# Patient Record
Sex: FEMALE | Race: WHITE | Employment: OTHER | URBAN - METROPOLITAN AREA
[De-identification: names, ages, dates, MRNs, and addresses within clinical notes are randomized per-mention and may not be internally consistent; named-entity substitution may affect disease eponyms.]

---

## 2017-01-13 PROBLEM — C83.30 DLBCL (DIFFUSE LARGE B CELL LYMPHOMA) (HCC): Status: ACTIVE | Noted: 2017-01-13

## 2017-01-20 PROBLEM — D70.9 NEUTROPENIC FEVER (HCC): Status: ACTIVE | Noted: 2017-01-20

## 2017-01-20 PROBLEM — R50.81 NEUTROPENIC FEVER (HCC): Status: ACTIVE | Noted: 2017-01-20

## 2017-01-27 ENCOUNTER — TELEPHONE (OUTPATIENT)
Dept: SURGERY | Age: 62
End: 2017-01-27

## 2017-01-31 ENCOUNTER — HOSPITAL ENCOUNTER (OUTPATIENT)
Dept: PREADMISSION TESTING | Age: 62
Discharge: HOME | End: 2017-02-21
Admitting: SURGERY

## 2017-02-03 ENCOUNTER — TELEPHONE (OUTPATIENT)
Dept: SURGERY | Age: 62
End: 2017-02-03

## 2017-02-23 ENCOUNTER — OFFICE VISIT (OUTPATIENT)
Dept: PULMONOLOGY | Age: 62
End: 2017-02-23

## 2017-02-23 VITALS
OXYGEN SATURATION: 99 % | WEIGHT: 240 LBS | HEART RATE: 96 BPM | DIASTOLIC BLOOD PRESSURE: 76 MMHG | SYSTOLIC BLOOD PRESSURE: 128 MMHG | RESPIRATION RATE: 20 BRPM | HEIGHT: 59 IN | BODY MASS INDEX: 48.38 KG/M2

## 2017-02-23 DIAGNOSIS — J96.11 CHRONIC HYPOXEMIC RESPIRATORY FAILURE (HCC): ICD-10-CM

## 2017-02-23 DIAGNOSIS — C83.30 DLBCL (DIFFUSE LARGE B CELL LYMPHOMA) (HCC): ICD-10-CM

## 2017-02-23 DIAGNOSIS — J18.9 PNEUMONIA OF LEFT LOWER LOBE DUE TO INFECTIOUS ORGANISM: Primary | ICD-10-CM

## 2017-02-23 PROCEDURE — 99214 OFFICE O/P EST MOD 30 MIN: CPT | Performed by: INTERNAL MEDICINE

## 2017-02-23 RX ORDER — MONTELUKAST SODIUM 10 MG/1
10 TABLET ORAL DAILY
Qty: 30 TABLET | Refills: 3 | Status: SHIPPED | OUTPATIENT
Start: 2017-02-23 | End: 2017-03-09 | Stop reason: ALTCHOICE

## 2017-02-23 RX ORDER — ZOLPIDEM TARTRATE 5 MG/1
5 TABLET ORAL NIGHTLY PRN
COMMUNITY
End: 2017-02-23 | Stop reason: SDUPTHER

## 2017-02-23 RX ORDER — PREDNISONE 10 MG/1
10 TABLET ORAL DAILY
Status: ON HOLD | COMMUNITY
End: 2017-03-03 | Stop reason: HOSPADM

## 2017-05-18 ENCOUNTER — TELEPHONE (OUTPATIENT)
Dept: PULMONOLOGY | Age: 62
End: 2017-05-18

## 2017-05-25 ENCOUNTER — HOSPITAL ENCOUNTER (OUTPATIENT)
Dept: CT IMAGING | Age: 62
Discharge: HOME | End: 2017-05-25
Attending: INTERNAL MEDICINE | Admitting: INTERNAL MEDICINE

## 2017-05-25 DIAGNOSIS — C83.30 DIFFUSE LARGE B-CELL LYMPHOMA (HCC): ICD-10-CM

## 2017-05-25 DIAGNOSIS — C83.30 DLBCL (DIFFUSE LARGE B CELL LYMPHOMA) (HCC): ICD-10-CM

## 2017-05-25 DIAGNOSIS — R19.7 DIARRHEA, UNSPECIFIED TYPE: ICD-10-CM

## 2017-05-31 LAB
C. DIFF GLUTAMATE DEHYDROGENASE: NORMAL
CLOSTRIDIUM DIFFICILE TOXIN GE: NOT DETECTED

## 2018-07-18 ENCOUNTER — APPOINTMENT (OUTPATIENT)
Dept: CT IMAGING | Age: 63
DRG: 191 | End: 2018-07-18
Payer: MEDICAID

## 2018-07-18 ENCOUNTER — HOSPITAL ENCOUNTER (INPATIENT)
Age: 63
LOS: 3 days | Discharge: NURSING, REHAB OR LONG-TERM CARE | DRG: 191 | End: 2018-07-25
Attending: EMERGENCY MEDICINE | Admitting: EMERGENCY MEDICINE
Payer: MEDICAID

## 2018-07-18 ENCOUNTER — APPOINTMENT (OUTPATIENT)
Dept: GENERAL RADIOLOGY | Age: 63
DRG: 191 | End: 2018-07-18
Payer: MEDICAID

## 2018-07-18 DIAGNOSIS — I20.0 UNSTABLE ANGINA PECTORIS (HCC): Primary | ICD-10-CM

## 2018-07-18 DIAGNOSIS — Z95.1 HX OF CABG: ICD-10-CM

## 2018-07-18 DIAGNOSIS — N39.0 URINARY TRACT INFECTION ASSOCIATED WITH INDWELLING URETHRAL CATHETER, INITIAL ENCOUNTER (HCC): ICD-10-CM

## 2018-07-18 DIAGNOSIS — T83.511A URINARY TRACT INFECTION ASSOCIATED WITH INDWELLING URETHRAL CATHETER, INITIAL ENCOUNTER (HCC): ICD-10-CM

## 2018-07-18 DIAGNOSIS — C82.32 FOLLICULAR LYMPHOMA GRADE IIIA OF INTRATHORACIC LYMPH NODES (HCC): ICD-10-CM

## 2018-07-18 PROBLEM — R07.9 CHEST PAIN: Status: ACTIVE | Noted: 2018-07-18

## 2018-07-18 LAB
ALBUMIN SERPL-MCNC: 3.5 G/DL (ref 3.4–5)
ALP BLD-CCNC: 106 U/L (ref 40–129)
ALT SERPL-CCNC: 9 U/L (ref 10–40)
ANION GAP SERPL CALCULATED.3IONS-SCNC: 13 MMOL/L (ref 3–16)
AST SERPL-CCNC: 15 U/L (ref 15–37)
BACTERIA: ABNORMAL /HPF
BASOPHILS ABSOLUTE: 0 K/UL (ref 0–0.2)
BASOPHILS RELATIVE PERCENT: 0.7 %
BILIRUB SERPL-MCNC: <0.2 MG/DL (ref 0–1)
BILIRUBIN DIRECT: <0.2 MG/DL (ref 0–0.3)
BILIRUBIN URINE: NEGATIVE MG/DL
BILIRUBIN, INDIRECT: ABNORMAL MG/DL (ref 0–1)
BLOOD, URINE: ABNORMAL
BUN BLDV-MCNC: 29 MG/DL (ref 7–20)
CALCIUM SERPL-MCNC: 9.3 MG/DL (ref 8.3–10.6)
CHLORIDE BLD-SCNC: 98 MMOL/L (ref 99–110)
CLARITY: ABNORMAL
CO2: 27 MMOL/L (ref 21–32)
COLOR: ABNORMAL
CREAT SERPL-MCNC: 1.2 MG/DL (ref 0.6–1.2)
EKG ATRIAL RATE: 63 BPM
EKG DIAGNOSIS: NORMAL
EKG P AXIS: 69 DEGREES
EKG P-R INTERVAL: 164 MS
EKG Q-T INTERVAL: 448 MS
EKG QRS DURATION: 118 MS
EKG QTC CALCULATION (BAZETT): 458 MS
EKG R AXIS: -58 DEGREES
EKG T AXIS: 71 DEGREES
EKG VENTRICULAR RATE: 63 BPM
EOSINOPHILS ABSOLUTE: 0.2 K/UL (ref 0–0.6)
EOSINOPHILS RELATIVE PERCENT: 2.9 %
EPITHELIAL CELLS, UA: ABNORMAL /HPF
GFR AFRICAN AMERICAN: 55
GFR NON-AFRICAN AMERICAN: 45
GLUCOSE BLD-MCNC: 115 MG/DL (ref 70–99)
GLUCOSE BLD-MCNC: 136 MG/DL (ref 70–99)
GLUCOSE BLD-MCNC: 227 MG/DL (ref 70–99)
GLUCOSE URINE: NEGATIVE MG/DL
HCT VFR BLD CALC: 27.4 % (ref 36–48)
HEMOGLOBIN: 8.6 G/DL (ref 12–16)
KETONES, URINE: NEGATIVE MG/DL
LEUKOCYTE ESTERASE, URINE: ABNORMAL
LIPASE: 21 U/L (ref 13–60)
LV EF: 41 %
LVEF MODALITY: NORMAL
LYMPHOCYTES ABSOLUTE: 1.2 K/UL (ref 1–5.1)
LYMPHOCYTES RELATIVE PERCENT: 16.1 %
MCH RBC QN AUTO: 28.9 PG (ref 26–34)
MCHC RBC AUTO-ENTMCNC: 31.3 G/DL (ref 31–36)
MCV RBC AUTO: 92.3 FL (ref 80–100)
MICROSCOPIC EXAMINATION: YES
MONOCYTES ABSOLUTE: 0.6 K/UL (ref 0–1.3)
MONOCYTES RELATIVE PERCENT: 8.8 %
NEUTROPHILS ABSOLUTE: 5.1 K/UL (ref 1.7–7.7)
NEUTROPHILS RELATIVE PERCENT: 71.5 %
NITRITE, URINE: POSITIVE
PDW BLD-RTO: 16.8 % (ref 12.4–15.4)
PERFORMED ON: ABNORMAL
PERFORMED ON: ABNORMAL
PH UA: 5.5
PLATELET # BLD: 203 K/UL (ref 135–450)
PMV BLD AUTO: 9.3 FL (ref 5–10.5)
POTASSIUM REFLEX MAGNESIUM: 5.2 MMOL/L (ref 3.5–5.1)
PRO-BNP: 2523 PG/ML (ref 0–124)
PROTEIN UA: >=300 MG/DL
RBC # BLD: 2.97 M/UL (ref 4–5.2)
RBC UA: ABNORMAL /HPF (ref 0–2)
SODIUM BLD-SCNC: 138 MMOL/L (ref 136–145)
SPECIFIC GRAVITY UA: 1.01
TOTAL PROTEIN: 7.1 G/DL (ref 6.4–8.2)
TROPONIN: <0.01 NG/ML
UROBILINOGEN, URINE: 0.2 E.U./DL
WBC # BLD: 7.2 K/UL (ref 4–11)
WBC UA: ABNORMAL /HPF (ref 0–5)

## 2018-07-18 PROCEDURE — 71046 X-RAY EXAM CHEST 2 VIEWS: CPT

## 2018-07-18 PROCEDURE — 94799 UNLISTED PULMONARY SVC/PX: CPT

## 2018-07-18 PROCEDURE — 83880 ASSAY OF NATRIURETIC PEPTIDE: CPT

## 2018-07-18 PROCEDURE — 80076 HEPATIC FUNCTION PANEL: CPT

## 2018-07-18 PROCEDURE — G0378 HOSPITAL OBSERVATION PER HR: HCPCS

## 2018-07-18 PROCEDURE — 99285 EMERGENCY DEPT VISIT HI MDM: CPT

## 2018-07-18 PROCEDURE — 36415 COLL VENOUS BLD VENIPUNCTURE: CPT

## 2018-07-18 PROCEDURE — 85025 COMPLETE CBC W/AUTO DIFF WBC: CPT

## 2018-07-18 PROCEDURE — 94640 AIRWAY INHALATION TREATMENT: CPT

## 2018-07-18 PROCEDURE — 80048 BASIC METABOLIC PNL TOTAL CA: CPT

## 2018-07-18 PROCEDURE — 6360000002 HC RX W HCPCS: Performed by: INTERNAL MEDICINE

## 2018-07-18 PROCEDURE — C8929 TTE W OR WO FOL WCON,DOPPLER: HCPCS

## 2018-07-18 PROCEDURE — 87077 CULTURE AEROBIC IDENTIFY: CPT

## 2018-07-18 PROCEDURE — 71250 CT THORAX DX C-: CPT

## 2018-07-18 PROCEDURE — 83036 HEMOGLOBIN GLYCOSYLATED A1C: CPT

## 2018-07-18 PROCEDURE — 6370000000 HC RX 637 (ALT 250 FOR IP): Performed by: EMERGENCY MEDICINE

## 2018-07-18 PROCEDURE — 81001 URINALYSIS AUTO W/SCOPE: CPT

## 2018-07-18 PROCEDURE — 94760 N-INVAS EAR/PLS OXIMETRY 1: CPT

## 2018-07-18 PROCEDURE — 93005 ELECTROCARDIOGRAM TRACING: CPT | Performed by: EMERGENCY MEDICINE

## 2018-07-18 PROCEDURE — 2580000003 HC RX 258: Performed by: INTERNAL MEDICINE

## 2018-07-18 PROCEDURE — 2700000000 HC OXYGEN THERAPY PER DAY

## 2018-07-18 PROCEDURE — 6360000002 HC RX W HCPCS: Performed by: EMERGENCY MEDICINE

## 2018-07-18 PROCEDURE — 87186 SC STD MICRODIL/AGAR DIL: CPT

## 2018-07-18 PROCEDURE — 6370000000 HC RX 637 (ALT 250 FOR IP): Performed by: INTERNAL MEDICINE

## 2018-07-18 PROCEDURE — 84484 ASSAY OF TROPONIN QUANT: CPT

## 2018-07-18 PROCEDURE — 83690 ASSAY OF LIPASE: CPT

## 2018-07-18 PROCEDURE — 96374 THER/PROPH/DIAG INJ IV PUSH: CPT

## 2018-07-18 PROCEDURE — 87086 URINE CULTURE/COLONY COUNT: CPT

## 2018-07-18 RX ORDER — ALBUTEROL SULFATE 2.5 MG/3ML
2.5 SOLUTION RESPIRATORY (INHALATION) 3 TIMES DAILY
Status: DISCONTINUED | OUTPATIENT
Start: 2018-07-18 | End: 2018-07-25 | Stop reason: HOSPADM

## 2018-07-18 RX ORDER — HYDROCODONE BITARTRATE AND ACETAMINOPHEN 5; 325 MG/1; MG/1
1 TABLET ORAL ONCE
Status: COMPLETED | OUTPATIENT
Start: 2018-07-18 | End: 2018-07-18

## 2018-07-18 RX ORDER — DEXTROSE MONOHYDRATE 25 G/50ML
12.5 INJECTION, SOLUTION INTRAVENOUS PRN
Status: DISCONTINUED | OUTPATIENT
Start: 2018-07-18 | End: 2018-07-25 | Stop reason: HOSPADM

## 2018-07-18 RX ORDER — DIAZEPAM 5 MG/1
2.5 TABLET ORAL EVERY 12 HOURS PRN
Status: DISCONTINUED | OUTPATIENT
Start: 2018-07-18 | End: 2018-07-25 | Stop reason: HOSPADM

## 2018-07-18 RX ORDER — GABAPENTIN 100 MG/1
100 CAPSULE ORAL 3 TIMES DAILY
Status: DISCONTINUED | OUTPATIENT
Start: 2018-07-18 | End: 2018-07-25 | Stop reason: HOSPADM

## 2018-07-18 RX ORDER — ACETAMINOPHEN 325 MG/1
650 TABLET ORAL EVERY 4 HOURS PRN
Status: DISCONTINUED | OUTPATIENT
Start: 2018-07-18 | End: 2018-07-25 | Stop reason: HOSPADM

## 2018-07-18 RX ORDER — ACETAMINOPHEN 325 MG/1
650 TABLET ORAL EVERY 4 HOURS PRN
COMMUNITY

## 2018-07-18 RX ORDER — POLYETHYLENE GLYCOL 3350 17 G/17G
17 POWDER, FOR SOLUTION ORAL DAILY PRN
COMMUNITY

## 2018-07-18 RX ORDER — METHOCARBAMOL 500 MG/1
250 TABLET, FILM COATED ORAL 2 TIMES DAILY
COMMUNITY

## 2018-07-18 RX ORDER — NITROGLYCERIN 0.4 MG/1
0.4 TABLET SUBLINGUAL EVERY 5 MIN PRN
Status: COMPLETED | OUTPATIENT
Start: 2018-07-18 | End: 2018-07-24

## 2018-07-18 RX ORDER — MAGNESIUM OXIDE 400 MG/1
400 TABLET ORAL 2 TIMES DAILY
COMMUNITY

## 2018-07-18 RX ORDER — NICOTINE POLACRILEX 4 MG
15 LOZENGE BUCCAL PRN
Status: DISCONTINUED | OUTPATIENT
Start: 2018-07-18 | End: 2018-07-25 | Stop reason: HOSPADM

## 2018-07-18 RX ORDER — VENLAFAXINE 75 MG/1
75 TABLET ORAL 3 TIMES DAILY
Status: DISCONTINUED | OUTPATIENT
Start: 2018-07-18 | End: 2018-07-18

## 2018-07-18 RX ORDER — CARVEDILOL 12.5 MG/1
12.5 TABLET ORAL 2 TIMES DAILY
Status: DISCONTINUED | OUTPATIENT
Start: 2018-07-18 | End: 2018-07-21

## 2018-07-18 RX ORDER — GABAPENTIN 600 MG/1
600 TABLET ORAL NIGHTLY
COMMUNITY

## 2018-07-18 RX ORDER — ONDANSETRON 2 MG/ML
4 INJECTION INTRAMUSCULAR; INTRAVENOUS EVERY 6 HOURS PRN
Status: DISCONTINUED | OUTPATIENT
Start: 2018-07-18 | End: 2018-07-25 | Stop reason: HOSPADM

## 2018-07-18 RX ORDER — SODIUM CHLORIDE 0.9 % (FLUSH) 0.9 %
10 SYRINGE (ML) INJECTION PRN
Status: DISCONTINUED | OUTPATIENT
Start: 2018-07-18 | End: 2018-07-25 | Stop reason: HOSPADM

## 2018-07-18 RX ORDER — ALBUTEROL SULFATE 2.5 MG/3ML
2.5 SOLUTION RESPIRATORY (INHALATION) EVERY 6 HOURS PRN
Status: DISCONTINUED | OUTPATIENT
Start: 2018-07-18 | End: 2018-07-25 | Stop reason: HOSPADM

## 2018-07-18 RX ORDER — DEXTROSE MONOHYDRATE 50 MG/ML
100 INJECTION, SOLUTION INTRAVENOUS PRN
Status: DISCONTINUED | OUTPATIENT
Start: 2018-07-18 | End: 2018-07-25 | Stop reason: HOSPADM

## 2018-07-18 RX ORDER — NITROGLYCERIN 0.4 MG/1
0.4 TABLET SUBLINGUAL EVERY 5 MIN PRN
Status: DISCONTINUED | OUTPATIENT
Start: 2018-07-18 | End: 2018-07-18

## 2018-07-18 RX ORDER — ERGOCALCIFEROL 1.25 MG/1
50000 CAPSULE ORAL
COMMUNITY

## 2018-07-18 RX ORDER — ONDANSETRON 2 MG/ML
4 INJECTION INTRAMUSCULAR; INTRAVENOUS
Status: DISCONTINUED | OUTPATIENT
Start: 2018-07-18 | End: 2018-07-18

## 2018-07-18 RX ORDER — CALCIUM CARBONATE/VITAMIN D3 600 MG-10
1 TABLET ORAL DAILY
COMMUNITY

## 2018-07-18 RX ORDER — NITROFURANTOIN MACROCRYSTALS 50 MG/1
50 CAPSULE ORAL EVERY 6 HOURS SCHEDULED
Status: DISCONTINUED | OUTPATIENT
Start: 2018-07-18 | End: 2018-07-22

## 2018-07-18 RX ORDER — ATORVASTATIN CALCIUM 40 MG/1
40 TABLET, FILM COATED ORAL NIGHTLY
Status: DISCONTINUED | OUTPATIENT
Start: 2018-07-18 | End: 2018-07-25 | Stop reason: HOSPADM

## 2018-07-18 RX ORDER — HYDROCODONE BITARTRATE AND ACETAMINOPHEN 5; 325 MG/1; MG/1
1 TABLET ORAL EVERY 4 HOURS PRN
Status: DISCONTINUED | OUTPATIENT
Start: 2018-07-18 | End: 2018-07-20

## 2018-07-18 RX ORDER — ZOLPIDEM TARTRATE 5 MG/1
5 TABLET ORAL NIGHTLY PRN
Status: DISCONTINUED | OUTPATIENT
Start: 2018-07-18 | End: 2018-07-18

## 2018-07-18 RX ORDER — SODIUM CHLORIDE 0.9 % (FLUSH) 0.9 %
10 SYRINGE (ML) INJECTION EVERY 12 HOURS SCHEDULED
Status: DISCONTINUED | OUTPATIENT
Start: 2018-07-18 | End: 2018-07-25 | Stop reason: HOSPADM

## 2018-07-18 RX ORDER — GABAPENTIN 300 MG/1
300 CAPSULE ORAL 2 TIMES DAILY
COMMUNITY

## 2018-07-18 RX ORDER — PANTOPRAZOLE SODIUM 40 MG/1
40 TABLET, DELAYED RELEASE ORAL DAILY
COMMUNITY

## 2018-07-18 RX ORDER — HYDROCODONE BITARTRATE AND ACETAMINOPHEN 5; 325 MG/1; MG/1
2 TABLET ORAL EVERY 4 HOURS PRN
Status: DISCONTINUED | OUTPATIENT
Start: 2018-07-18 | End: 2018-07-20

## 2018-07-18 RX ORDER — AMLODIPINE BESYLATE 5 MG/1
5 TABLET ORAL DAILY
Status: DISCONTINUED | OUTPATIENT
Start: 2018-07-18 | End: 2018-07-18

## 2018-07-18 RX ORDER — CALCITRIOL 0.25 UG/1
0.5 CAPSULE, LIQUID FILLED ORAL DAILY
Status: DISCONTINUED | OUTPATIENT
Start: 2018-07-18 | End: 2018-07-18

## 2018-07-18 RX ORDER — ACYCLOVIR 200 MG/1
400 CAPSULE ORAL 3 TIMES DAILY
Status: DISCONTINUED | OUTPATIENT
Start: 2018-07-18 | End: 2018-07-18

## 2018-07-18 RX ORDER — NITROFURANTOIN 25; 75 MG/1; MG/1
100 CAPSULE ORAL ONCE
Status: COMPLETED | OUTPATIENT
Start: 2018-07-18 | End: 2018-07-18

## 2018-07-18 RX ORDER — ASPIRIN 81 MG/1
81 TABLET ORAL DAILY
Status: DISCONTINUED | OUTPATIENT
Start: 2018-07-19 | End: 2018-07-25 | Stop reason: HOSPADM

## 2018-07-18 RX ADMIN — GABAPENTIN 100 MG: 100 CAPSULE ORAL at 20:31

## 2018-07-18 RX ADMIN — NITROFURANTOIN MONOHYDRATE AND NITROFURANTOIN, MACROCRYSTALLINE 100 MG: 75; 25 CAPSULE ORAL at 16:06

## 2018-07-18 RX ADMIN — ENOXAPARIN SODIUM 40 MG: 40 INJECTION SUBCUTANEOUS at 20:31

## 2018-07-18 RX ADMIN — HYDROCODONE BITARTRATE AND ACETAMINOPHEN 1 TABLET: 5; 325 TABLET ORAL at 15:25

## 2018-07-18 RX ADMIN — Medication 10 ML: at 20:32

## 2018-07-18 RX ADMIN — HYDROCODONE BITARTRATE AND ACETAMINOPHEN 2 TABLET: 5; 325 TABLET ORAL at 21:22

## 2018-07-18 RX ADMIN — ATORVASTATIN CALCIUM 40 MG: 40 TABLET, FILM COATED ORAL at 20:30

## 2018-07-18 RX ADMIN — NITROGLYCERIN 0.4 MG: 0.4 TABLET SUBLINGUAL at 13:19

## 2018-07-18 RX ADMIN — ALBUTEROL SULFATE 2.5 MG: 2.5 SOLUTION RESPIRATORY (INHALATION) at 18:56

## 2018-07-18 RX ADMIN — NITROFURANTOIN MACROCRYSTALS 50 MG: 50 CAPSULE ORAL at 20:30

## 2018-07-18 RX ADMIN — CARVEDILOL 12.5 MG: 12.5 TABLET, FILM COATED ORAL at 20:30

## 2018-07-18 RX ADMIN — ONDANSETRON 4 MG: 2 INJECTION INTRAMUSCULAR; INTRAVENOUS at 13:19

## 2018-07-18 ASSESSMENT — PAIN DESCRIPTION - ORIENTATION
ORIENTATION: LOWER;MID
ORIENTATION: LOWER

## 2018-07-18 ASSESSMENT — PAIN DESCRIPTION - ONSET
ONSET: ON-GOING
ONSET: ON-GOING

## 2018-07-18 ASSESSMENT — ENCOUNTER SYMPTOMS
NAUSEA: 1
COUGH: 0
SHORTNESS OF BREATH: 1
ABDOMINAL PAIN: 0
DIARRHEA: 0
VOMITING: 0

## 2018-07-18 ASSESSMENT — PAIN DESCRIPTION - LOCATION
LOCATION: BACK
LOCATION: CHEST
LOCATION: ABDOMEN;BACK

## 2018-07-18 ASSESSMENT — PAIN DESCRIPTION - FREQUENCY
FREQUENCY: CONTINUOUS
FREQUENCY: INTERMITTENT

## 2018-07-18 ASSESSMENT — PAIN DESCRIPTION - PAIN TYPE
TYPE: ACUTE PAIN;CHRONIC PAIN
TYPE: CHRONIC PAIN
TYPE: ACUTE PAIN

## 2018-07-18 ASSESSMENT — PAIN SCALES - GENERAL
PAINLEVEL_OUTOF10: 7
PAINLEVEL_OUTOF10: 4
PAINLEVEL_OUTOF10: 9
PAINLEVEL_OUTOF10: 9
PAINLEVEL_OUTOF10: 7
PAINLEVEL_OUTOF10: 6

## 2018-07-18 ASSESSMENT — PAIN DESCRIPTION - PROGRESSION
CLINICAL_PROGRESSION: NOT CHANGED
CLINICAL_PROGRESSION: GRADUALLY WORSENING

## 2018-07-18 ASSESSMENT — ACTIVITIES OF DAILY LIVING (ADL): EFFECT OF PAIN ON DAILY ACTIVITIES: DISCOMFORT

## 2018-07-18 ASSESSMENT — PAIN DESCRIPTION - DESCRIPTORS
DESCRIPTORS: ACHING
DESCRIPTORS: ACHING

## 2018-07-18 ASSESSMENT — PAIN DESCRIPTION - DIRECTION: RADIATING_TOWARDS: NECK

## 2018-07-18 NOTE — ED PROVIDER NOTES
[address] [facility]          ATTENDING PHYSICIAN NOTE       Date of evaluation: 7/18/2018    Chief Complaint     Chest Pain and Shortness of Breath      History of Present Illness     Julianna Simmonds is a 57 y.o. female with a history of coronary artery disease, bypass surgery and stent in 2015, who presents with pressure-like chest pain that started 2 hours ago similar to prior ACS pain. She had associated SOB, nausea and diaphoresis. The pain radiated from the left chest to left axilla and left arm. Earlier in the am the patient was having exertional dyspnea that improved with rest as well. The symptoms were concerning to her that her heart might be causing the discomfort. She had a new nitroglycerin patch applied this morning but did not take any additional nitroglycerin when the pain developed. She is not reporting productive cough, fever, or chills. She does have a little bit of lightheadedness associated with the symptoms. The patient also reports some lower abdominal pain for several days duration as well. She has an indwelling Ba catheter. Review of Systems     Review of Systems   Constitutional: Positive for diaphoresis and fatigue. Negative for chills and fever. Respiratory: Positive for shortness of breath. Negative for cough. Cardiovascular: Positive for chest pain. Gastrointestinal: Positive for nausea. Negative for abdominal pain, diarrhea and vomiting. Genitourinary: Positive for difficulty urinating. Neurological: Positive for light-headedness. All other systems reviewed and are negative. Past Medical, Surgical, Family, and Social History     She has a past medical history of Arthritis; Asthma; Cancer ([address] Chronic airway obstruction ([address] Chronic back pain; Coronary artery disease; CRF (chronic renal failure); Diabetes mellitus ([address] Hidradenitis; History of blood transfusion; Hyperlipidemia; Hypertension;  Hypothyroidism; Interstitial lung disease ([facility] NSTEMI (non-ST elevated myocardial infarction) ([facility] [address] Sciatica; and Seborrhea capitis. She has a past surgical history that includes Ovary removal; Tonsillectomy; Carpal tunnel release; Coronary angioplasty with stent; and Coronary artery bypass graft (04/2015). Her family history includes Heart Disease in her brother and father; Kidney Disease in her brother; Other in her sister; Stroke in her father and mother. She reports that she quit smoking about 39 years ago. She has never used smokeless tobacco. She reports that she does not drink alcohol or use drugs. Medications     Previous Medications    ALBUTEROL (PROVENTIL) (2.5 MG/3ML) 0.083% NEBULIZER SOLUTION    Take 3 mLs by nebulization every 6 hours as needed (cough)    ALBUTEROL SULFATE  (90 BASE) MCG/ACT INHALER    Inhale 2 puffs into the lungs every 6 hours as needed for Shortness of Breath    AMLODIPINE (NORVASC) 5 MG TABLET    Take 1 tablet by mouth daily    ASPIRIN 81 MG EC TABLET    Take 81 mg by mouth daily    ATORVASTATIN (LIPITOR) 40 MG TABLET    Take 40 mg by mouth nightly     CALCITRIOL (ROCALTROL) 0.5 MCG CAPSULE    Take 1 capsule by mouth daily    CARVEDILOL (COREG) 12.5 MG TABLET    Take 1 tablet by mouth 2 times daily    CETIRIZINE (ZYRTEC) 10 MG TABLET    Take 1 tablet by mouth daily    CLONIDINE (CATAPRES) 0.1 MG TABLET    Take 0.1 mg by mouth every 6 hours as needed (SBP greater than 160)     GABAPENTIN (NEURONTIN) 100 MG CAPSULE    Take 1 capsule by mouth 3 times daily    HYDROCODONE-ACETAMINOPHEN (NORCO) 5-325 MG PER TABLET    Take 1 tablet by mouth every 6 hours as needed for Pain .  Earliest Fill Date: 6/1/17    INSULIN DETEMIR (LEVEMIR) 100 UNIT/ML INJECTION VIAL    Inject 25 Units into the skin 2 times daily    INSULIN LISPRO (HUMALOG) 100 UNIT/ML INJECTION VIAL    Inject 2-10 Units into the skin 3 times daily (before meals) Per home sliding scale    LEVOTHYROXINE (SYNTHROID) 175 MCG TABLET MPV 9.3 5.0 - 10.5 fL    Neutrophils % 71.5 %    Lymphocytes % 16.1 %    Monocytes % 8.8 %    Eosinophils % 2.9 %    Basophils % 0.7 %    Neutrophils # 5.1 1.7 - 7.7 K/uL    Lymphocytes # 1.2 1.0 - 5.1 K/uL    Monocytes # 0.6 0.0 - 1.3 K/uL    Eosinophils # 0.2 0.0 - 0.6 K/uL    Basophils # 0.0 0.0 - 0.2 K/uL   Hepatic function panel   Result Value Ref Range    Total Protein 7.1 6.4 - 8.2 g/dL    Alb 3.5 3.4 - 5.0 g/dL    Alkaline Phosphatase 106 40 - 129 U/L    ALT 9 (L) 10 - 40 U/L    AST 15 15 - 37 U/L    Total Bilirubin <0.2 0.0 - 1.0 mg/dL    Bilirubin, Direct <0.2 0.0 - 0.3 mg/dL    Bilirubin, Indirect see below 0.0 - 1.0 mg/dL   Lipase   Result Value Ref Range    Lipase 21.0 13.0 - 60.0 U/L   Basic Metabolic Panel w/ Reflex to MG   Result Value Ref Range    Sodium 138 136 - 145 mmol/L    Potassium reflex Magnesium 5.2 (H) 3.5 - 5.1 mmol/L    Chloride 98 (L) 99 - 110 mmol/L    CO2 27 21 - 32 mmol/L    Anion Gap 13 3 - 16    Glucose 227 (H) 70 - 99 mg/dL    BUN 29 (H) 7 - 20 mg/dL    CREATININE 1.2 0.6 - 1.2 mg/dL    GFR Non-African American 45 (A) >60    GFR  55 (A) >60    Calcium 9.3 8.3 - 10.6 mg/dL   Troponin   Result Value Ref Range    Troponin <0.01 <0.01 ng/mL   Brain natriuretic peptide   Result Value Ref Range    Pro-BNP 2,523 (H) 0 - 124 pg/mL   POC URINE with Microscopic   Result Value Ref Range    Color, UA Not Entered Straw/Yellow    Clarity, UA Not Entered Clear    Glucose, Ur Negative Negative mg/dL    Bilirubin Urine Negative Negative mg/dL    Ketones, Urine Negative Negative mg/dL    Specific Gravity, UA 1.010 1.005 - 1.030    Blood, Urine TRACE-LYSED (A) Negative    pH, UA 5.5 5.0 - 8.0    Protein, UA >=300 (A) Negative mg/dL    Urobilinogen, Urine 0.2 <2.0 E.U./dL    Nitrite, Urine POSITIVE (A) Negative    Leukocyte Esterase, Urine TRACE (A) Negative    Microscopic Examination Yes    EKG 12 Lead   Result Value Ref Range    Ventricular Rate 63 BPM    Atrial Rate 63 BPM    P-R

## 2018-07-18 NOTE — H&P
Hospital Medicine History & Physical      PCP: Jae Beatriz    Date of Admission: 7/18/2018    Date of Service: Pt seen/examined on 07/18/18 and placed in observation. Chief Complaint:  Chest pain and shortness of breath      History Of Present Illness:      57 y.o. female who presented to [facility] emergency room. Has been complaining of anterior pressure-like chest pain that started 2 hours before presenting to the emergency room, this was very similar to prior coronary artery pain. Also complains of associated dyspnea, nausea and diaphoresis. The whole episode lasted about half an hour. At the time of the interview, patient was pain free  The pain started at left chest, and radiated to to left axilla and left arm. States last night she also had an episode of chest tightness but it was of short duration. Earlier in the morning today, the patient was having shortness of breath with exertion that improved with rest.    Patient got concerned because the pain was similar to her previous angina. A new nitroglycerin patch applied this morning, patient did not take any additional nitroglycerin when the pain developed. Otherwise, patient does not have any productive cough, fever, or chills. She does have a little bit of lightheadedness associated with the symptoms. The patient also reports some lower abdominal pain for several days. She has an indwelling Ba catheter. It was inserted during last hospitalization because of urinary retention. Was not meant to be long-term, but patient has not had time to see a urologist yet.     Past Medical History:          Diagnosis Date    Arthritis     Asthma     Cancer ([facility] [address]     Chronic airway obstruction (HCC)     Chronic back pain     Coronary artery disease 10/2014    2 stents to LAD    CRF (chronic renal failure)     baseline Cr 1.1    Diabetes mellitus (HCC)     A1c 6.6 11/30/2014    Hidradenitis     History of blood transfusion     Hyperlipidemia     Hypertension     Hypothyroidism     Interstitial lung disease ([facility] [address]     NSTEMI (non-ST elevated myocardial infarction) ([facility] [address] 10/2014    Sciatica     Seborrhea capitis        Past Surgical History:          Procedure Laterality Date    CARPAL TUNNEL RELEASE      right arm    CORONARY ANGIOPLASTY WITH STENT PLACEMENT      CORONARY ARTERY BYPASS GRAFT  04/2015    OVARY REMOVAL      TONSILLECTOMY         Medications Prior to Admission:      Prior to Admission medications    Medication Sig Start Date End Date Taking? Authorizing Provider   venlafaxine (EFFEXOR) 75 MG tablet Take 1 tablet by mouth 3 times daily 6/1/17   MILKA Nunez CNP   calcitRIOL (ROCALTROL) 0.5 MCG capsule Take 1 capsule by mouth daily 6/1/17   MILKA Nunez CNP   carvedilol (COREG) 12.5 MG tablet Take 1 tablet by mouth 2 times daily 6/1/17   MILKA Nunez CNP   HYDROcodone-acetaminophen (NORCO) 5-325 MG per tablet Take 1 tablet by mouth every 6 hours as needed for Pain . Earliest Fill Date: 6/1/17 6/1/17   MILKA Nunez CNP   Lenoard Sayres Mixture with lidocaine 1-2 tsp s/swallow q4-6h don't eat x 15 min after. 5/10/17   Roberta Kendrick., DO   ondansetron (ZOFRAN) 8 MG tablet TAKE ONE TABLET BY MOUTH EVERY 8 HOURS AS NEEDED FOR NAUSEA OR VOMITING 5/9/17   Roberta Kendrick., DO   valACYclovir (VALTREX) 500 MG tablet Take 1 tablet by mouth 2 times daily 4/14/17   MILKA Nunez CNP   zolpidem (AMBIEN) 5 MG tablet Take 1 tablet by mouth nightly as needed for Sleep 4/14/17   MILKA Nunez CNP   nystatin (MYCOSTATIN) 562531 UNIT/ML suspension Take 5 mLs by mouth 4 times daily Swish solution in mouth before swallowing. 4/12/17   MILKA Nunez CNP   valACYclovir (VALTREX) 500 MG tablet Take 1500mg (3 tablets) every 12 hours for first 3 doses, then take 500mg (1 tablet) 2 times daily.  4/7/17   Nadia Thompson Cheadle, APRN - CNP   albuterol (PROVENTIL) (2.5 MG/3ML) 0.083% nebulizer solution Take 3 mLs by nebulization every 6 hours as needed (cough) 2/16/17   MILKA Ludwig CNP   albuterol sulfate  (90 BASE) MCG/ACT inhaler Inhale 2 puffs into the lungs every 6 hours as needed for Shortness of Breath 2/16/17   MILKA Ludwig CNP   mometasone-formoterol [facility]) 200-5 MCG/ACT inhaler Inhale 2 puffs into the lungs 2 times daily 1/27/17   Joann Lyon MD   cetirizine (ZYRTEC) 10 MG tablet Take 1 tablet by mouth daily 1/27/17   Joann Lyon MD   amLODIPine (NORVASC) 5 MG tablet Take 1 tablet by mouth daily 1/27/17   Joann Lyon MD   senna (SENOKOT) 8.6 MG TABS tablet Take 1 tablet by mouth 2 times daily 12/7/16   Joann Lyon MD   insulin detemir (LEVEMIR) 100 UNIT/ML injection vial Inject 25 Units into the skin 2 times daily 12/7/16   Joann Lyon MD   aspirin 81 MG EC tablet Take 81 mg by mouth daily    Historical Provider, MD   cloNIDine (CATAPRES) 0.1 MG tablet Take 0.1 mg by mouth every 6 hours as needed (SBP greater than 160)     Historical Provider, MD   gabapentin (NEURONTIN) 100 MG capsule Take 1 capsule by mouth 3 times daily 8/16/16   Kalyani Jones MD   nitroGLYCERIN (NITRODUR) 0.4 MG/HR Place 1 patch onto the skin nightly    Historical Provider, MD   atorvastatin (LIPITOR) 40 MG tablet Take 40 mg by mouth nightly     Historical Provider, MD   omeprazole (PRILOSEC) 20 MG capsule Take 20 mg by mouth daily    Historical Provider, MD   simethicone (MYLICON) 80 MG chewable tablet Take 80 mg by mouth every 6 hours as needed for Flatulence     Historical Provider, MD   sodium chloride (OCEAN, BABY AYR) 0.65 % nasal spray 1 spray by Each Nare route as needed for Congestion    Historical Provider, MD   loperamide (IMODIUM) 2 MG capsule Take 2 mg by mouth 4 times daily as needed for Diarrhea    Historical Provider, MD   insulin clear.  Neck: Supple, with full range of motion. No jugular venous distention. Trachea midline. Respiratory:  Normal respiratory effort. Clear to auscultation, bilaterally without Rales/Wheezes/Rhonchi. Cardiovascular:  Regular rate and rhythm with normal S1/S2 without murmurs, rubs or gallops. Abdomen: Soft, non-tender, non-distended with normal bowel sounds. Musculoskeletal:  No clubbing, cyanosis or edema bilaterally. Full range of motion without deformity. Skin: Skin color, texture, turgor normal.  No rashes or lesions. Neurologic:  Neurovascularly intact without any focal sensory/motor deficits. Cranial nerves: II-XII intact, grossly non-focal.  Psychiatric:  Alert and oriented, thought content appropriate, normal insight  Capillary Refill: Brisk,< 3 seconds   Peripheral Pulses: +2 palpable, equal bilaterally       Labs:     Recent Labs      07/18/18   1324   WBC  7.2   HGB  8.6*   HCT  27.4*   PLT  203     Recent Labs      07/18/18   1324   NA  138   K  5.2*   CL  98*   CO2  27   BUN  29*   CREATININE  1.2   CALCIUM  9.3     Recent Labs      07/18/18   1324   AST  15   ALT  9*   BILIDIR  <0.2   BILITOT  <0.2   ALKPHOS  106     No results for input(s): INR in the last 72 hours. Recent Labs      07/18/18   1324   TROPONINI  <0.01       Urinalysis:      Lab Results   Component Value Date    NITRU POSITIVE 07/18/2018    WBCUA 3-5 07/18/2018    BACTERIA 2+ 07/18/2018    RBCUA None seen 07/18/2018    BLOODU TRACE-LYSED 07/18/2018    SPECGRAV 1.010 07/18/2018    GLUCOSEU Negative 07/18/2018       Radiology:     CXR: I have reviewed the CXR with the following interpretation: Unremarkable  EKG:  I have reviewed the EKG with the following interpretation: Normal sinus rhythm, 1 PVC, no signs of acute or chronic ischemia. XR CHEST STANDARD (2 VW)   Final Result      Impression: No acute process.       Interpreted by:   Lina Cary MD      Signed by:   Lina Cary MD   7/18/18      Final result

## 2018-07-18 NOTE — PROGRESS NOTES
4 Eyes Admission Assessment     I agree as the admission nurse that 2 RN's have performed a thorough Head to Toe Skin Assessment on the patient. ALL assessment sites listed below have been assessed on admission. Areas assessed by both nurses:   [x]   Head, Face, and Ears   [x]   Shoulders, Back, and Chest  [x]   Arms, Elbows, and Hands   [x]   Coccyx, Sacrum, and Ischum  [x]   Legs, Feet, and Heels        Does the Patient have Skin Breakdown? Yes a wound was noted on the Admission Assessment and an LDA was Initiated documentation include the Katelin-wound, Wound Assessment, Measurements, Dressing Treatment, Drainage, and Color\",         Jaya Prevention initiated:  Yes   Wound Care Orders initiated:  Yes      WOC nurse consulted for Pressure Injury (Stage 3,4, Unstageable, DTI, NWPT, and Complex wounds):  Yes      Nurse 1 eSignature: Electronically signed by Ludy Sheikh RN on 7/18/2018 at 6:25 PM      **SHARE this note so that the co-signing nurse is able to place an eSignature**    Nurse 2 eSignature: Electronically signed by Rex Soliz RN on 7/20/18 at 6:32 AM

## 2018-07-18 NOTE — ED TRIAGE NOTES
Pt c/o chest pain and sob x2 hours. Pt was diaphoretic at nursing home, given 324 of aspirin and has nitro patch on right upper chest. Pt states chest pain feels better but breathing \"feels bad. \"

## 2018-07-18 NOTE — PROGRESS NOTES
education:  Excellent     Is patient being placed on Home Treatment Regimen? Yes     Does the patient have everything they need prior to discharge? NA     Comments: Pt not admitted with respiratory illness. Plan of Care: Continue HHN Albuterol TID and prn, MDI Dulera 2p BID and 02. Electronically signed by Beatriz Perkins RCP on 7/18/2018 at 7:05 PM    Respiratory Protocol Guidelines     1. Assessment and treatment by Respiratory Therapy will be initiated for medication and therapeutic interventions upon initiation of aerosolized medication. 2. Physician will be contacted for respiratory rate (RR) greater than 35 breaths per minute. Therapy will be held for heart rate (HR) greater than 140 beats per minute, pending direction from physician. 3. Bronchodilators will be administered via Metered Dose Inhaler (MDI) with spacer when the following criteria are met:  a. Alert and cooperative     b. HR < 140 bpm  c. RR < 30 bpm                d. Can demonstrate a 2-3 second inspiratory hold  4. Bronchodilators will be administered via Hand Held Nebulizer [facility]) to patients when ANY of the following criteria are met  a. Incognizant or uncooperative          b. Patients treated with HHN at Home        c. Unable to demonstrate proper use of MDI with spacer     d. RR > 30 bpm   5. Bronchodilators will be delivered via Metered Dose Inhaler (MDI), HHN, Aerogen to intubated patients on mechanical ventilation. 6. Inhalation medication orders will be delivered and/or substituted as outlined below. Aerosolized Medications Ordering and Administration Guidelines:    1. All Medications will be ordered by a physician, and their frequency and/or modality will be adjusted as defined by the patients Respiratory Severity Index (RSI) score.   2. If the patient does not have documented COPD, consider discontinuing anticholinergics when RSI is less than 9.  3. If the bronchospasm worsens (increased RSI), then the bronchodilator

## 2018-07-19 ENCOUNTER — APPOINTMENT (OUTPATIENT)
Dept: NUCLEAR MEDICINE | Age: 63
DRG: 191 | End: 2018-07-19
Payer: MEDICAID

## 2018-07-19 PROBLEM — Z95.1 HX OF CABG: Status: ACTIVE | Noted: 2018-07-19

## 2018-07-19 LAB
A/G RATIO: 1 (ref 1.1–2.2)
ALBUMIN SERPL-MCNC: 3.1 G/DL (ref 3.4–5)
ALP BLD-CCNC: 97 U/L (ref 40–129)
ALT SERPL-CCNC: 9 U/L (ref 10–40)
ANION GAP SERPL CALCULATED.3IONS-SCNC: 11 MMOL/L (ref 3–16)
AST SERPL-CCNC: 16 U/L (ref 15–37)
BASOPHILS ABSOLUTE: 0 K/UL (ref 0–0.2)
BASOPHILS RELATIVE PERCENT: 0.3 %
BILIRUB SERPL-MCNC: <0.2 MG/DL (ref 0–1)
BUN BLDV-MCNC: 27 MG/DL (ref 7–20)
CALCIUM SERPL-MCNC: 9 MG/DL (ref 8.3–10.6)
CHLORIDE BLD-SCNC: 103 MMOL/L (ref 99–110)
CHOLESTEROL, TOTAL: 86 MG/DL (ref 0–199)
CO2: 29 MMOL/L (ref 21–32)
CREAT SERPL-MCNC: 1.2 MG/DL (ref 0.6–1.2)
EOSINOPHILS ABSOLUTE: 0.2 K/UL (ref 0–0.6)
EOSINOPHILS RELATIVE PERCENT: 3.3 %
ESTIMATED AVERAGE GLUCOSE: 211.6 MG/DL
GFR AFRICAN AMERICAN: 55
GFR NON-AFRICAN AMERICAN: 45
GLOBULIN: 3.2 G/DL
GLUCOSE BLD-MCNC: 134 MG/DL (ref 70–99)
GLUCOSE BLD-MCNC: 157 MG/DL (ref 70–99)
GLUCOSE BLD-MCNC: 157 MG/DL (ref 70–99)
GLUCOSE BLD-MCNC: 160 MG/DL (ref 70–99)
HBA1C MFR BLD: 9 %
HCT VFR BLD CALC: 25.1 % (ref 36–48)
HDLC SERPL-MCNC: 27 MG/DL (ref 40–60)
HEMOGLOBIN: 8.1 G/DL (ref 12–16)
LDL CHOLESTEROL CALCULATED: 18 MG/DL
LV EF: 50 %
LVEF MODALITY: NORMAL
LYMPHOCYTES ABSOLUTE: 1 K/UL (ref 1–5.1)
LYMPHOCYTES RELATIVE PERCENT: 15.9 %
MCH RBC QN AUTO: 29.3 PG (ref 26–34)
MCHC RBC AUTO-ENTMCNC: 32.2 G/DL (ref 31–36)
MCV RBC AUTO: 91.1 FL (ref 80–100)
MONOCYTES ABSOLUTE: 0.6 K/UL (ref 0–1.3)
MONOCYTES RELATIVE PERCENT: 9.5 %
NEUTROPHILS ABSOLUTE: 4.4 K/UL (ref 1.7–7.7)
NEUTROPHILS RELATIVE PERCENT: 71 %
PDW BLD-RTO: 17.2 % (ref 12.4–15.4)
PERFORMED ON: ABNORMAL
PLATELET # BLD: 186 K/UL (ref 135–450)
PMV BLD AUTO: 9.3 FL (ref 5–10.5)
POTASSIUM REFLEX MAGNESIUM: 4.6 MMOL/L (ref 3.5–5.1)
RBC # BLD: 2.75 M/UL (ref 4–5.2)
SODIUM BLD-SCNC: 143 MMOL/L (ref 136–145)
TOTAL PROTEIN: 6.3 G/DL (ref 6.4–8.2)
TRIGL SERPL-MCNC: 207 MG/DL (ref 0–150)
TROPONIN: 0.01 NG/ML
VLDLC SERPL CALC-MCNC: 41 MG/DL
WBC # BLD: 6.2 K/UL (ref 4–11)

## 2018-07-19 PROCEDURE — 78452 HT MUSCLE IMAGE SPECT MULT: CPT

## 2018-07-19 PROCEDURE — 94760 N-INVAS EAR/PLS OXIMETRY 1: CPT

## 2018-07-19 PROCEDURE — 85025 COMPLETE CBC W/AUTO DIFF WBC: CPT

## 2018-07-19 PROCEDURE — 2700000000 HC OXYGEN THERAPY PER DAY

## 2018-07-19 PROCEDURE — 3430000000 HC RX DIAGNOSTIC RADIOPHARMACEUTICAL: Performed by: INTERNAL MEDICINE

## 2018-07-19 PROCEDURE — 6360000002 HC RX W HCPCS: Performed by: INTERNAL MEDICINE

## 2018-07-19 PROCEDURE — 94640 AIRWAY INHALATION TREATMENT: CPT

## 2018-07-19 PROCEDURE — 84484 ASSAY OF TROPONIN QUANT: CPT

## 2018-07-19 PROCEDURE — 2500000003 HC RX 250 WO HCPCS: Performed by: INTERNAL MEDICINE

## 2018-07-19 PROCEDURE — 36415 COLL VENOUS BLD VENIPUNCTURE: CPT

## 2018-07-19 PROCEDURE — 80053 COMPREHEN METABOLIC PANEL: CPT

## 2018-07-19 PROCEDURE — A9502 TC99M TETROFOSMIN: HCPCS | Performed by: INTERNAL MEDICINE

## 2018-07-19 PROCEDURE — 6360000004 HC RX CONTRAST MEDICATION: Performed by: INTERNAL MEDICINE

## 2018-07-19 PROCEDURE — G0378 HOSPITAL OBSERVATION PER HR: HCPCS

## 2018-07-19 PROCEDURE — 2580000003 HC RX 258: Performed by: INTERNAL MEDICINE

## 2018-07-19 PROCEDURE — 94664 DEMO&/EVAL PT USE INHALER: CPT

## 2018-07-19 PROCEDURE — 80061 LIPID PANEL: CPT

## 2018-07-19 PROCEDURE — 93005 ELECTROCARDIOGRAM TRACING: CPT | Performed by: INTERNAL MEDICINE

## 2018-07-19 PROCEDURE — 93017 CV STRESS TEST TRACING ONLY: CPT

## 2018-07-19 PROCEDURE — 6370000000 HC RX 637 (ALT 250 FOR IP): Performed by: INTERNAL MEDICINE

## 2018-07-19 RX ADMIN — PERFLUTREN 2.2 MG: 6.52 INJECTION, SUSPENSION INTRAVENOUS at 14:58

## 2018-07-19 RX ADMIN — TETROFOSMIN 30 MILLICURIE: 1.38 INJECTION, POWDER, LYOPHILIZED, FOR SOLUTION INTRAVENOUS at 13:47

## 2018-07-19 RX ADMIN — ALBUTEROL SULFATE 2.5 MG: 2.5 SOLUTION RESPIRATORY (INHALATION) at 08:00

## 2018-07-19 RX ADMIN — NITROGLYCERIN 0.4 MG: 0.4 TABLET SUBLINGUAL at 21:31

## 2018-07-19 RX ADMIN — GABAPENTIN 100 MG: 100 CAPSULE ORAL at 19:12

## 2018-07-19 RX ADMIN — HYDROCODONE BITARTRATE AND ACETAMINOPHEN 2 TABLET: 5; 325 TABLET ORAL at 03:23

## 2018-07-19 RX ADMIN — INSULIN LISPRO 1 UNITS: 100 INJECTION, SOLUTION INTRAVENOUS; SUBCUTANEOUS at 22:46

## 2018-07-19 RX ADMIN — ASPIRIN 81 MG: 81 TABLET, COATED ORAL at 09:33

## 2018-07-19 RX ADMIN — Medication 10 ML: at 12:11

## 2018-07-19 RX ADMIN — LEVOTHYROXINE SODIUM 175 MCG: 150 TABLET ORAL at 05:20

## 2018-07-19 RX ADMIN — Medication 10 ML: at 22:33

## 2018-07-19 RX ADMIN — ONDANSETRON 4 MG: 2 INJECTION INTRAMUSCULAR; INTRAVENOUS at 19:12

## 2018-07-19 RX ADMIN — HYDROCODONE BITARTRATE AND ACETAMINOPHEN 2 TABLET: 5; 325 TABLET ORAL at 22:30

## 2018-07-19 RX ADMIN — ALBUTEROL SULFATE 2.5 MG: 2.5 SOLUTION RESPIRATORY (INHALATION) at 21:46

## 2018-07-19 RX ADMIN — ATORVASTATIN CALCIUM 40 MG: 40 TABLET, FILM COATED ORAL at 22:29

## 2018-07-19 RX ADMIN — TETROFOSMIN 10 MILLICURIE: 1.38 INJECTION, POWDER, LYOPHILIZED, FOR SOLUTION INTRAVENOUS at 07:47

## 2018-07-19 RX ADMIN — AMINOPHYLLINE 75 MG: 25 INJECTION, SOLUTION INTRAVENOUS at 13:48

## 2018-07-19 RX ADMIN — HYDROCODONE BITARTRATE AND ACETAMINOPHEN 2 TABLET: 5; 325 TABLET ORAL at 09:33

## 2018-07-19 RX ADMIN — Medication 10 ML: at 07:48

## 2018-07-19 RX ADMIN — CARVEDILOL 12.5 MG: 12.5 TABLET, FILM COATED ORAL at 16:36

## 2018-07-19 RX ADMIN — NITROFURANTOIN MACROCRYSTALS 50 MG: 50 CAPSULE ORAL at 19:12

## 2018-07-19 RX ADMIN — REGADENOSON 0.4 MG: 0.08 INJECTION, SOLUTION INTRAVENOUS at 13:47

## 2018-07-19 RX ADMIN — ENOXAPARIN SODIUM 40 MG: 40 INJECTION SUBCUTANEOUS at 09:33

## 2018-07-19 RX ADMIN — NITROFURANTOIN MACROCRYSTALS 50 MG: 50 CAPSULE ORAL at 03:23

## 2018-07-19 RX ADMIN — NITROGLYCERIN 0.4 MG: 0.4 TABLET SUBLINGUAL at 21:38

## 2018-07-19 RX ADMIN — ONDANSETRON 4 MG: 2 INJECTION INTRAMUSCULAR; INTRAVENOUS at 00:42

## 2018-07-19 RX ADMIN — HYDROCODONE BITARTRATE AND ACETAMINOPHEN 2 TABLET: 5; 325 TABLET ORAL at 16:36

## 2018-07-19 RX ADMIN — GABAPENTIN 100 MG: 100 CAPSULE ORAL at 12:10

## 2018-07-19 RX ADMIN — CARVEDILOL 12.5 MG: 12.5 TABLET, FILM COATED ORAL at 22:29

## 2018-07-19 RX ADMIN — NITROFURANTOIN MACROCRYSTALS 50 MG: 50 CAPSULE ORAL at 12:10

## 2018-07-19 ASSESSMENT — PAIN DESCRIPTION - PROGRESSION
CLINICAL_PROGRESSION: NOT CHANGED

## 2018-07-19 ASSESSMENT — PAIN DESCRIPTION - PAIN TYPE
TYPE: ACUTE PAIN;CHRONIC PAIN
TYPE: CHRONIC PAIN;ACUTE PAIN
TYPE: CHRONIC PAIN
TYPE: CHRONIC PAIN

## 2018-07-19 ASSESSMENT — PAIN DESCRIPTION - ONSET
ONSET: ON-GOING

## 2018-07-19 ASSESSMENT — PAIN SCALES - GENERAL
PAINLEVEL_OUTOF10: 9
PAINLEVEL_OUTOF10: 6
PAINLEVEL_OUTOF10: 7
PAINLEVEL_OUTOF10: 7
PAINLEVEL_OUTOF10: 10
PAINLEVEL_OUTOF10: 9

## 2018-07-19 ASSESSMENT — PAIN DESCRIPTION - LOCATION
LOCATION: ABDOMEN;BACK
LOCATION: ABDOMEN;BACK;GENERALIZED
LOCATION: ABDOMEN;BACK
LOCATION: ABDOMEN

## 2018-07-19 ASSESSMENT — PAIN DESCRIPTION - DESCRIPTORS
DESCRIPTORS: ACHING

## 2018-07-19 ASSESSMENT — PAIN DESCRIPTION - ORIENTATION
ORIENTATION: LOWER;MID

## 2018-07-19 ASSESSMENT — PAIN DESCRIPTION - FREQUENCY
FREQUENCY: CONTINUOUS

## 2018-07-19 NOTE — CONSULTS
History of Present Illness:  Ami Maradiaga is a 57 y.o. patient whom we were asked by the hospitalists to see for chest pain. Chest pain was sharp initially and persisted. Somewhat different from anything prior. Some sob/diaphoresis. She says it lasted a total of about 30 minutes. Had resolved by time she got to ER. No recent sx. No pnd or orthopnea. No palp/tachycardia/syncope. Past Medical History:   has a past medical history of Arthritis; Asthma; Cancer ([address] Chronic airway obstruction ([address] Chronic back pain; Coronary artery disease; CRF (chronic renal failure); Diabetes mellitus ([address] Hidradenitis; History of blood transfusion; Hyperlipidemia; Hypertension; Hypothyroidism; Interstitial lung disease ([address] NSTEMI (non-ST elevated myocardial infarction) ([facility] Sciatica; and Seborrhea capitis. Surgical History:   has a past surgical history that includes Ovary removal; Tonsillectomy; Carpal tunnel release; Coronary angioplasty with stent; and Coronary artery bypass graft (04/2015). Social History:   reports that she quit smoking about 39 years ago. She has never used smokeless tobacco. She reports that she does not drink alcohol or use drugs. Family History:  No evidence for sudden cardiac death or premature CAD    Home Medications:  Were reviewed and are listed in nursing record. and/or listed below  Prior to Admission medications    Medication Sig Start Date End Date Taking? Authorizing Provider   Calcium Carb-Cholecalciferol (CALCIUM-VITAMIN D) 600-400 MG-UNIT TABS Take 1 tablet by mouth daily   Yes Historical Provider, MD   gabapentin (NEURONTIN) 600 MG tablet Take 600 mg by mouth nightly. .   Yes Historical Provider, MD   gabapentin (NEURONTIN) 300 MG capsule Take 300 mg by mouth 2 times daily. .   Yes Historical Provider, MD   magnesium oxide (MAG-OX) 400 MG tablet Take 400 mg by mouth 2 times daily   Yes Historical Provider, MD   pantoprazole (PROTONIX) 40 MG tablet Take 40 mg by mouth daily   Yes Historical Provider, MD   Prenatal Vit-Fe Fumarate-FA (PNV PRENATAL PLUS MULTIVITAMIN PO) Take 1 tablet by mouth daily   Yes Historical Provider, MD   vitamin D (ERGOCALCIFEROL) 12942 units CAPS capsule Take 50,000 Units by mouth every 30 days   Yes Historical Provider, MD   methocarbamol (ROBAXIN) 500 MG tablet Take 250 mg by mouth 2 times daily   Yes Historical Provider, MD   Menthol, Topical Analgesic, (BIOFREEZE EX) Apply topically 2 times daily   Yes Historical Provider, MD   insulin detemir (LEVEMIR) 100 UNIT/ML injection vial Inject 50 Units into the skin 2 times daily   Yes Historical Provider, MD   Probiotic Product (ACIDOPHILUS/BIFIDUS PO) Take 2 capsules by mouth 2 times daily   Yes Historical Provider, MD   carvedilol (COREG) 12.5 MG tablet Take 1 tablet by mouth 2 times daily 6/1/17  Yes Wadell Furbish Cheadle, APRN - CNP   albuterol (PROVENTIL) (2.5 MG/3ML) 0.083% nebulizer solution Take 3 mLs by nebulization every 6 hours as needed (cough) 2/16/17  Yes MILKA Duggan CNP   aspirin 81 MG EC tablet Take 81 mg by mouth daily   Yes Historical Provider, MD   nitroGLYCERIN (NITRODUR) 0.4 MG/HR Place 1 patch onto the skin daily    Yes Historical Provider, MD   atorvastatin (LIPITOR) 40 MG tablet Take 40 mg by mouth nightly    Yes Historical Provider, MD   insulin lispro (HUMALOG) 100 UNIT/ML injection vial Inject 2-10 Units into the skin 3 times daily (before meals) Per home sliding scale   Yes Historical Provider, MD   levothyroxine (SYNTHROID) 175 MCG tablet Take 175 mcg by mouth every morning (before breakfast)    Yes Historical Provider, MD   acetaminophen (TYLENOL) 325 MG tablet Take 650 mg by mouth every 4 hours as needed for Pain or Fever    Historical Provider, MD   polyethylene glycol (GLYCOLAX) powder Take 17 g by mouth daily as needed    Historical Provider, MD   HYDROcodone-acetaminophen (NORCO) 5-325 MG per tablet Take 1 tablet by mouth every 6 hours as needed No gait disturbance, weakness or joint complaints. · Integumentary: No rash or pruritis. · Other systems reviewed negative a done.     Physical Examination:    Vitals:    07/19/18 0928   BP: (!) 165/78   Pulse: 63   Resp: 16   Temp:    SpO2: 100%    Weight: 270 lb (122.5 kg)         General Appearance:  Alert, cooperative, no distress, appears stated age   Head:  Normocephalic, without obvious abnormality, atraumatic   Eyes:  Conjunctiva/corneas clear       Nose: Nares normal   Throat: Lips normal   Neck: Supple, symmetrical, trachea midline       Lungs:   Clear to auscultation bilaterally, respirations unlabored   Chest Wall:  No tenderness or deformity   Heart:  Regular rate and rhythm, S1, S2 normal, no murmur, rub or gallop   Abdomen:   Soft, non-tender, bowel sounds active all four quadrants,  no masses, no organomegaly           Extremities: Extremities normal, atraumatic, no cyanosis       Skin: Skin color, texture, turgor normal, no rashes or lesions   Pysch: Normal mood and affect   Neurologic: Normal gross motor and sensory exam.         Labs  CBC:   Lab Results   Component Value Date    WBC 6.2 07/19/2018    RBC 2.75 07/19/2018    RBC 2.98 04/14/2017    HGB 8.1 07/19/2018    HCT 25.1 07/19/2018    MCV 91.1 07/19/2018    RDW 17.2 07/19/2018     07/19/2018     CMP:    Lab Results   Component Value Date     07/19/2018    K 4.6 07/19/2018     07/19/2018    CO2 29 07/19/2018    BUN 27 07/19/2018    CREATININE 1.2 07/19/2018    GFRAA 55 07/19/2018    AGRATIO 1.0 07/19/2018    LABGLOM 45 07/19/2018    GLUCOSE 134 07/19/2018    GLUCOSE 235 03/30/2017    PROT 6.3 07/19/2018    PROT 6.7 03/30/2017    CALCIUM 9.0 07/19/2018    BILITOT <0.2 07/19/2018    ALKPHOS 97 07/19/2018    AST 16 07/19/2018    ALT 9 07/19/2018     PT/INR:  No results found for: PTINR  Lab Results   Component Value Date    CKTOTAL 280 (H) 10/03/2014    CKMB 3.3 10/04/2014    TROPONINI <0.01 07/18/2018       EKG:  I have reviewed EKG with the following interpretation:  Impression:  NSR, PVC, IVCD, LAFB. Assessment  Patient Active Problem List   Diagnosis    Atypical chest pain    Systolic murmur    Essential hypertension    Hypothyroidism    Interstitial lung disease ([address]    Back pain    Morbid obesity due to excess calories ([address]    NSTEMI (non-ST elevated myocardial infarction) ([address]    Respiratory failure ([address]    Coronary artery disease due to lipid rich plaque    Shortness of breath    PNA (pneumonia)    Calf pain    Acute encephalopathy    Chronic respiratory failure, unspecified whether with hypoxia or hypercapnia (HCC)    Normocytic anemia    Accidental fall from other furniture    Hypertension, well controlled    Hyperlipidemia    Anemia    Hydradenitis    Pyelonephritis    Sepsis ([address]    Hydradenitis    Calculus of gallbladder    Pleural effusion    Chronic hypoxemic respiratory failure (HCC)    Fall    Pleural effusion    Pleural effusion, left    Lymphadenopathy, periaortic    Agranulocytosis secondary to cancer chemotherapy (HCC)    Follicular lymphoma grade IIIa of intrathoracic lymph nodes ([address]    Encounter for antineoplastic chemotherapy    DLBCL (diffuse large B cell lymphoma) (HCC)    Neutropenic fever (HCC)    Chronic obstructive pulmonary disease with acute exacerbation (HCC)    Pain in thoracic spine    Paroxysmal tachycardia (HCC)    V tach (HCC)    Chest pain    Urinary tract infection associated with indwelling urethral catheter ([address]    Hx of CABG         Plan:    Chest pain atypical.  Negative serial enzymes. No ischemic changes by EKG. Agree with Myoview.

## 2018-07-19 NOTE — PROGRESS NOTES
hours. Recent Labs      07/18/18   1324  07/18/18   2041  07/18/18   2311   TROPONINI  <0.01  <0.01  <0.01       Urinalysis:    Lab Results   Component Value Date    NITRU POSITIVE 07/18/2018    WBCUA 3-5 07/18/2018    BACTERIA 2+ 07/18/2018    RBCUA None seen 07/18/2018    BLOODU TRACE-LYSED 07/18/2018    SPECGRAV 1.010 07/18/2018    GLUCOSEU Negative 07/18/2018       Radiology:  CT CHEST WO CONTRAST   Final Result      1. Small left pleural effusion. 2. Mild mediastinal lymphadenopathy which could be reactive or   neoplastic. 3. Cholelithiasis. 4. Soft tissue density in the retroperitoneum as seen previously   which could relate to previously treated lymphoma. Active lymphoma   be difficult to exclude. Interpreted by:   Sonal Alexis MD      Signed by:   Sonal Alexis MD   7/19/18      Final result      XR CHEST STANDARD (2 VW)   Final Result      Impression: No acute process. 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 Troponin trend negative  - Now has been pain free; NM stress test pending  - Cardiology consulted  - CT chest was done which showed mild pleural effusion, mediastinal LAD and soft tissue swelling in retroperitoneum.     Hx of DLBCL: s/p R-CHOP rx; likely in remission  - will discuss with radiology about her imaging results  - Recent pleural fluid cytology was

## 2018-07-20 ENCOUNTER — APPOINTMENT (OUTPATIENT)
Dept: CARDIAC CATH/INVASIVE PROCEDURES | Age: 63
DRG: 191 | End: 2018-07-20
Payer: MEDICAID

## 2018-07-20 PROBLEM — R94.39 ABNORMAL MYOCARDIAL PERFUSION STUDY: Status: ACTIVE | Noted: 2018-07-20

## 2018-07-20 LAB
EKG ATRIAL RATE: 64 BPM
EKG DIAGNOSIS: NORMAL
EKG P AXIS: 80 DEGREES
EKG P-R INTERVAL: 176 MS
EKG Q-T INTERVAL: 446 MS
EKG QRS DURATION: 116 MS
EKG QTC CALCULATION (BAZETT): 460 MS
EKG R AXIS: -61 DEGREES
EKG T AXIS: 60 DEGREES
EKG VENTRICULAR RATE: 64 BPM
GLUCOSE BLD-MCNC: 140 MG/DL (ref 70–99)
GLUCOSE BLD-MCNC: 170 MG/DL (ref 70–99)
GLUCOSE BLD-MCNC: 223 MG/DL (ref 70–99)
GLUCOSE BLD-MCNC: 251 MG/DL (ref 70–99)
LACTATE DEHYDROGENASE: 147 U/L (ref 100–190)
ORGANISM: ABNORMAL
ORGANISM: ABNORMAL
PERFORMED ON: ABNORMAL
TROPONIN: <0.01 NG/ML
TROPONIN: <0.01 NG/ML
URINE CULTURE, ROUTINE: ABNORMAL

## 2018-07-20 PROCEDURE — G0378 HOSPITAL OBSERVATION PER HR: HCPCS

## 2018-07-20 PROCEDURE — 94760 N-INVAS EAR/PLS OXIMETRY 1: CPT

## 2018-07-20 PROCEDURE — 94664 DEMO&/EVAL PT USE INHALER: CPT

## 2018-07-20 PROCEDURE — 6360000002 HC RX W HCPCS: Performed by: INTERNAL MEDICINE

## 2018-07-20 PROCEDURE — 4A023N7 MEASUREMENT OF CARDIAC SAMPLING AND PRESSURE, LEFT HEART, PERCUTANEOUS APPROACH: ICD-10-PCS | Performed by: INTERNAL MEDICINE

## 2018-07-20 PROCEDURE — C1725 CATH, TRANSLUMIN NON-LASER: HCPCS

## 2018-07-20 PROCEDURE — 2580000003 HC RX 258: Performed by: INTERNAL MEDICINE

## 2018-07-20 PROCEDURE — 93459 L HRT ART/GRFT ANGIO: CPT

## 2018-07-20 PROCEDURE — 93458 L HRT ARTERY/VENTRICLE ANGIO: CPT | Performed by: INTERNAL MEDICINE

## 2018-07-20 PROCEDURE — 94640 AIRWAY INHALATION TREATMENT: CPT

## 2018-07-20 PROCEDURE — 99233 SBSQ HOSP IP/OBS HIGH 50: CPT | Performed by: INTERNAL MEDICINE

## 2018-07-20 PROCEDURE — 6370000000 HC RX 637 (ALT 250 FOR IP): Performed by: INTERNAL MEDICINE

## 2018-07-20 PROCEDURE — 84484 ASSAY OF TROPONIN QUANT: CPT

## 2018-07-20 PROCEDURE — 6370000000 HC RX 637 (ALT 250 FOR IP)

## 2018-07-20 PROCEDURE — C1769 GUIDE WIRE: HCPCS

## 2018-07-20 PROCEDURE — 99153 MOD SED SAME PHYS/QHP EA: CPT

## 2018-07-20 PROCEDURE — B2131ZZ FLUOROSCOPY OF MULTIPLE CORONARY ARTERY BYPASS GRAFTS USING LOW OSMOLAR CONTRAST: ICD-10-PCS | Performed by: INTERNAL MEDICINE

## 2018-07-20 PROCEDURE — 36415 COLL VENOUS BLD VENIPUNCTURE: CPT

## 2018-07-20 PROCEDURE — 6360000002 HC RX W HCPCS

## 2018-07-20 PROCEDURE — 6360000004 HC RX CONTRAST MEDICATION: Performed by: INTERNAL MEDICINE

## 2018-07-20 PROCEDURE — B2151ZZ FLUOROSCOPY OF LEFT HEART USING LOW OSMOLAR CONTRAST: ICD-10-PCS | Performed by: INTERNAL MEDICINE

## 2018-07-20 PROCEDURE — B2111ZZ FLUOROSCOPY OF MULTIPLE CORONARY ARTERIES USING LOW OSMOLAR CONTRAST: ICD-10-PCS | Performed by: INTERNAL MEDICINE

## 2018-07-20 PROCEDURE — 2709999900 HC NON-CHARGEABLE SUPPLY

## 2018-07-20 PROCEDURE — 93005 ELECTROCARDIOGRAM TRACING: CPT | Performed by: INTERNAL MEDICINE

## 2018-07-20 PROCEDURE — 99152 MOD SED SAME PHYS/QHP 5/>YRS: CPT

## 2018-07-20 PROCEDURE — 83615 LACTATE (LD) (LDH) ENZYME: CPT

## 2018-07-20 PROCEDURE — C1894 INTRO/SHEATH, NON-LASER: HCPCS

## 2018-07-20 PROCEDURE — 2700000000 HC OXYGEN THERAPY PER DAY

## 2018-07-20 RX ORDER — OXYCODONE HYDROCHLORIDE AND ACETAMINOPHEN 5; 325 MG/1; MG/1
1 TABLET ORAL EVERY 4 HOURS PRN
Status: DISCONTINUED | OUTPATIENT
Start: 2018-07-20 | End: 2018-07-25 | Stop reason: HOSPADM

## 2018-07-20 RX ORDER — MORPHINE SULFATE 2 MG/ML
1 INJECTION, SOLUTION INTRAMUSCULAR; INTRAVENOUS EVERY 4 HOURS PRN
Status: DISCONTINUED | OUTPATIENT
Start: 2018-07-20 | End: 2018-07-23

## 2018-07-20 RX ORDER — HYDRALAZINE HYDROCHLORIDE 25 MG/1
25 TABLET, FILM COATED ORAL EVERY 8 HOURS SCHEDULED
Status: DISCONTINUED | OUTPATIENT
Start: 2018-07-20 | End: 2018-07-25 | Stop reason: HOSPADM

## 2018-07-20 RX ORDER — MORPHINE SULFATE 2 MG/ML
2 INJECTION, SOLUTION INTRAMUSCULAR; INTRAVENOUS EVERY 4 HOURS PRN
Status: DISCONTINUED | OUTPATIENT
Start: 2018-07-20 | End: 2018-07-23

## 2018-07-20 RX ORDER — ACETAMINOPHEN 325 MG/1
650 TABLET ORAL EVERY 4 HOURS PRN
Status: DISCONTINUED | OUTPATIENT
Start: 2018-07-20 | End: 2018-07-25 | Stop reason: HOSPADM

## 2018-07-20 RX ORDER — SODIUM CHLORIDE 9 MG/ML
INJECTION, SOLUTION INTRAVENOUS CONTINUOUS
Status: ACTIVE | OUTPATIENT
Start: 2018-07-20 | End: 2018-07-20

## 2018-07-20 RX ADMIN — ALBUTEROL SULFATE 2.5 MG: 2.5 SOLUTION RESPIRATORY (INHALATION) at 08:45

## 2018-07-20 RX ADMIN — OXYCODONE HYDROCHLORIDE AND ACETAMINOPHEN 1 TABLET: 5; 325 TABLET ORAL at 22:52

## 2018-07-20 RX ADMIN — ASPIRIN 81 MG: 81 TABLET, COATED ORAL at 08:32

## 2018-07-20 RX ADMIN — HYDRALAZINE HYDROCHLORIDE 25 MG: 25 TABLET, FILM COATED ORAL at 22:22

## 2018-07-20 RX ADMIN — INSULIN LISPRO 4 UNITS: 100 INJECTION, SOLUTION INTRAVENOUS; SUBCUTANEOUS at 18:08

## 2018-07-20 RX ADMIN — NITROFURANTOIN MACROCRYSTALS 50 MG: 50 CAPSULE ORAL at 01:39

## 2018-07-20 RX ADMIN — NITROFURANTOIN MACROCRYSTALS 50 MG: 50 CAPSULE ORAL at 18:07

## 2018-07-20 RX ADMIN — HYDROCODONE BITARTRATE AND ACETAMINOPHEN 2 TABLET: 5; 325 TABLET ORAL at 12:33

## 2018-07-20 RX ADMIN — CARVEDILOL 12.5 MG: 12.5 TABLET, FILM COATED ORAL at 08:32

## 2018-07-20 RX ADMIN — HYDROCODONE BITARTRATE AND ACETAMINOPHEN 2 TABLET: 5; 325 TABLET ORAL at 08:28

## 2018-07-20 RX ADMIN — ATORVASTATIN CALCIUM 40 MG: 40 TABLET, FILM COATED ORAL at 20:23

## 2018-07-20 RX ADMIN — Medication 10 ML: at 08:33

## 2018-07-20 RX ADMIN — OXYCODONE HYDROCHLORIDE AND ACETAMINOPHEN 1 TABLET: 5; 325 TABLET ORAL at 18:44

## 2018-07-20 RX ADMIN — SODIUM CHLORIDE: 900 INJECTION, SOLUTION INTRAVENOUS at 19:32

## 2018-07-20 RX ADMIN — GABAPENTIN 100 MG: 100 CAPSULE ORAL at 08:33

## 2018-07-20 RX ADMIN — INSULIN LISPRO 3 UNITS: 100 INJECTION, SOLUTION INTRAVENOUS; SUBCUTANEOUS at 20:24

## 2018-07-20 RX ADMIN — HYDROCODONE BITARTRATE AND ACETAMINOPHEN 2 TABLET: 5; 325 TABLET ORAL at 02:44

## 2018-07-20 RX ADMIN — ONDANSETRON 4 MG: 2 INJECTION INTRAMUSCULAR; INTRAVENOUS at 04:36

## 2018-07-20 RX ADMIN — IOPAMIDOL 210 ML: 755 INJECTION, SOLUTION INTRAVENOUS at 14:52

## 2018-07-20 RX ADMIN — ONDANSETRON 4 MG: 2 INJECTION INTRAMUSCULAR; INTRAVENOUS at 10:50

## 2018-07-20 RX ADMIN — MORPHINE SULFATE 2 MG: 2 INJECTION, SOLUTION INTRAMUSCULAR; INTRAVENOUS at 17:40

## 2018-07-20 RX ADMIN — NITROFURANTOIN MACROCRYSTALS 50 MG: 50 CAPSULE ORAL at 06:58

## 2018-07-20 RX ADMIN — CARVEDILOL 12.5 MG: 12.5 TABLET, FILM COATED ORAL at 18:44

## 2018-07-20 RX ADMIN — ALBUTEROL SULFATE 2.5 MG: 2.5 SOLUTION RESPIRATORY (INHALATION) at 23:23

## 2018-07-20 RX ADMIN — LEVOTHYROXINE SODIUM 175 MCG: 150 TABLET ORAL at 06:58

## 2018-07-20 RX ADMIN — ACETAMINOPHEN 650 MG: 325 TABLET, FILM COATED ORAL at 20:23

## 2018-07-20 RX ADMIN — GABAPENTIN 100 MG: 100 CAPSULE ORAL at 20:23

## 2018-07-20 RX ADMIN — GABAPENTIN 100 MG: 100 CAPSULE ORAL at 02:43

## 2018-07-20 RX ADMIN — MORPHINE SULFATE 2 MG: 2 INJECTION, SOLUTION INTRAMUSCULAR; INTRAVENOUS at 21:50

## 2018-07-20 ASSESSMENT — PAIN DESCRIPTION - PROGRESSION
CLINICAL_PROGRESSION: NOT CHANGED

## 2018-07-20 ASSESSMENT — PAIN SCALES - GENERAL
PAINLEVEL_OUTOF10: 6
PAINLEVEL_OUTOF10: 8
PAINLEVEL_OUTOF10: 7
PAINLEVEL_OUTOF10: 8
PAINLEVEL_OUTOF10: 0
PAINLEVEL_OUTOF10: 7
PAINLEVEL_OUTOF10: 7
PAINLEVEL_OUTOF10: 10
PAINLEVEL_OUTOF10: 8
PAINLEVEL_OUTOF10: 9
PAINLEVEL_OUTOF10: 10
PAINLEVEL_OUTOF10: 7

## 2018-07-20 ASSESSMENT — PAIN DESCRIPTION - DESCRIPTORS
DESCRIPTORS: ACHING
DESCRIPTORS: PRESSURE
DESCRIPTORS: ACHING
DESCRIPTORS: PRESSURE

## 2018-07-20 ASSESSMENT — ACTIVITIES OF DAILY LIVING (ADL): EFFECT OF PAIN ON DAILY ACTIVITIES: DISCOMFORT

## 2018-07-20 ASSESSMENT — PAIN DESCRIPTION - PAIN TYPE
TYPE: ACUTE PAIN
TYPE: CHRONIC PAIN
TYPE: ACUTE PAIN

## 2018-07-20 ASSESSMENT — PAIN DESCRIPTION - ONSET
ONSET: ON-GOING

## 2018-07-20 ASSESSMENT — PAIN DESCRIPTION - LOCATION
LOCATION: GENERALIZED
LOCATION: BACK;GROIN
LOCATION: BACK;GROIN
LOCATION: GENERALIZED
LOCATION: ABDOMEN
LOCATION: GENERALIZED
LOCATION: GENERALIZED

## 2018-07-20 ASSESSMENT — PAIN DESCRIPTION - FREQUENCY
FREQUENCY: CONTINUOUS

## 2018-07-20 ASSESSMENT — PAIN DESCRIPTION - ORIENTATION
ORIENTATION: LEFT;LOWER
ORIENTATION: LEFT;LOWER
ORIENTATION: LOWER;MID

## 2018-07-20 NOTE — CONSULTS
Procedure: [facility]  Complication: none  Preliminary: LV uniform. EF 50%. LM ok. LAD 90% prox and 80% in stent with competitive flow from NICK JACOB II.RAFAEL. Cx small Om osteal 75% otherwise without disease. RCA 90% in distal Stent with competitive flow distal. SVG to PDA widely patent. Continue medical therapy.

## 2018-07-20 NOTE — PROGRESS NOTES
Rom[facility] Daily Progress Note      Admit Date:  7/18/2018    Subjective:  Ms. Octavio Bence was seen and examined. F/U chest pain/CAD/CABG, now abn myoview. Feeling better this am. No chest pain.      Objective:   BP (!) 154/70   Pulse 54   Temp 97.6 °F (36.4 °C) (Oral)   Resp 18   Ht 4' 11\" (1.499 m)   Wt 269 lb 1.6 oz (122.1 kg)   SpO2 98%   BMI 54.35 kg/m²     Intake/Output Summary (Last 24 hours) at 07/20/18 1234  Last data filed at 07/20/18 1025   Gross per 24 hour   Intake              240 ml   Output             2325 ml   Net            -2085 ml       TELEMETRY: Sinus     Physical Exam:  General:  Awake, alert, NAD  Skin:  Warm and dry  Neck:  JVP difficult to assess  Chest:  Decreased BS, respiration normal  Cardiovascular:  RRR S1S2  Abdomen:  Soft nontender  Extremities:  tr edema    Medications:    aspirin  81 mg Oral Daily    atorvastatin  40 mg Oral Nightly    carvedilol  12.5 mg Oral BID    gabapentin  100 mg Oral TID    insulin detemir  50 Units Subcutaneous BID    levothyroxine  175 mcg Oral QAM AC    sodium chloride flush  10 mL Intravenous 2 times per day    insulin lispro  0-12 Units Subcutaneous TID WC    insulin lispro  0-6 Units Subcutaneous Nightly    enoxaparin  40 mg Subcutaneous Daily    nitrofurantoin  50 mg Oral 4 times per day    albuterol  2.5 mg Nebulization TID      dextrose       albuterol, sodium chloride flush, acetaminophen, magnesium hydroxide, ondansetron, glucose, dextrose, glucagon (rDNA), dextrose, HYDROcodone 5 mg - acetaminophen **OR** HYDROcodone 5 mg - acetaminophen, nitroGLYCERIN, diazepam    Lab Data:  CBC: Recent Labs      07/18/18   1324  07/19/18   0516   WBC  7.2  6.2   HGB  8.6*  8.1*   HCT  27.4*  25.1*   MCV  92.3  91.1   PLT  203  186     BMP: Recent Labs      07/18/18   1324  07/19/18   0516   NA  138  143   K  5.2*  4.6   CL  98*  103   CO2  27  29   BUN  29*  27*   CREATININE  1.2  1.2     LIVER PROFILE:   Recent Labs 07/18/18   1324  07/19/18   0516   AST  15  16   ALT  9*  9*   LIPASE  21.0   --    BILIDIR  <0.2   --    BILITOT  <0.2  <0.2   ALKPHOS  106  97     PT/INR: No results for input(s): PROTIME, INR in the last 72 hours. APTT: No results for input(s): APTT in the last 72 hours. BNP:  No results for input(s): BNP in the last 72 hours.   IMAGING:     Assessment:  Patient Active Problem List    Diagnosis Date Noted    V [facility])      Priority: High    Pain in thoracic spine      Priority: High    Sepsis ([address] 05/19/2016     Priority: High    Anemia 01/31/2015     Priority: High    Hyperlipidemia      Priority: High    Acute encephalopathy 12/03/2014     Priority: High    Coronary artery disease due to lipid rich plaque      Priority: High    Atypical chest pain 11/23/2013     Priority: High    Systolic murmur 63/52/3178     Priority: High    Interstitial lung disease ([address] 11/23/2013     Priority: High    Morbid obesity due to excess calories ([address] 11/23/2013     Priority: High    Calculus of gallbladder 08/15/2016     Priority: Medium     Class: Chronic    Accidental fall from other furniture      Priority: Medium    Abnormal myocardial perfusion study 07/20/2018    Hx of CABG 07/19/2018    Chest pain 07/18/2018    Urinary tract infection associated with indwelling urethral catheter ([address] 07/18/2018    Paroxysmal tachycardia (HCC)     Chronic obstructive pulmonary disease with acute exacerbation (HCC)     Neutropenic fever ([address] 01/20/2017    DLBCL (diffuse large B cell lymphoma) ([address] 80/22/3523    Follicular lymphoma grade IIIa of intrathoracic lymph nodes ([address] 12/14/2016    Encounter for antineoplastic chemotherapy 12/14/2016    Agranulocytosis secondary to cancer chemotherapy ([address] 12/05/2016    Lymphadenopathy, periaortic     Pleural effusion, left 11/22/2016    Pleural effusion 10/03/2016    Chronic hypoxemic respiratory failure ([address] 10/03/2016    Fall 10/03/2016    Pleural effusion 10/03/2016    Hydradenitis 05/19/2016    Pyelonephritis 02/10/2016    Hydradenitis 02/01/2015    Hypertension, well controlled     Chronic respiratory failure, unspecified whether with hypoxia or hypercapnia ([facility] 12/03/2014    Normocytic anemia 12/03/2014    Shortness of breath 11/30/2014    PNA (pneumonia) 11/30/2014    Calf pain 11/30/2014    Respiratory failure ([address] 10/03/2014    NSTEMI (non-ST elevated myocardial infarction) ([facility] [facility] 10/01/2014    Essential hypertension 11/23/2013    Hypothyroidism 11/23/2013    Back pain 11/23/2013       Plan:  1. No chest pain.  myoview abn. Have recommended cath. Risk and bene explained. She is agreeable.        Core Measures:  · Discharge instructions:   · LVEF documented:   · ACEI for LV dysfunction:   · Smoking Cessation:    Celeste Contreras MD 7/20/2018 12:34 PM

## 2018-07-20 NOTE — CONSULTS
Brief Pre-Op Note/Sedation Assessment      Huber Josue  1955  [id number]  [id number]  12:48 PM    Planned Procedure: Cardiac Catheterization Procedure    Post Procedure Plan: Return to same level of care    Consent: I have discussed with the patient and/or the patient representative the indication, alternatives, and the possible risks and/or complications of the planned procedure and the anesthesia methods. The patient and/or patient representative appear to understand and agree to proceed. Chief Complaint: Chest Pain/Pressure      Indications for the Procedure:   CAD Presentation:  ACS <= 24 hrs  Anginal Classification within 2 weeks:  CCS IV - Inability to perform any activity without angina or angina at rest, i.e., severe limitation  NYHA Heart Failure Class within 2 weeks: No symptoms      Anti- Anginal Meds within 2 weeks:   ANTI-ANGINAL MEDS: Yes: Beta Blockers      Stress or Imaging Studies Performed:  Stress Test with SPECT Result: Positive:  anterior Risk/Extent of Ischemia:  High    Vital Signs:  BP (!) 154/70   Pulse 54   Temp 97.6 °F (36.4 °C) (Oral)   Resp 18   Ht 4' 11\" (1.499 m)   Wt 269 lb 1.6 oz (122.1 kg)   SpO2 98%   BMI 54.35 kg/m²     Allergies: Allergies   Allergen Reactions    Amoxicillin-Pot Clavulanate Itching    Actos [Pioglitazone]     Amoxicillin     Avandia [Rosiglitazone]      H/a blurred vision    Benicar [Olmesartan]     Buspirone Diarrhea    Cephalexin Diarrhea    Chlorhexidine Itching    Ciprofloxacin     Clindamycin/Lincomycin Diarrhea    Escitalopram Oxalate [Escitalopram Oxalate] Other (See Comments)     Depression night schmidt    Insulin Glargine Hives    Iodides Hives and Itching     Severe reaction in 1983.   Slight reaction in July 2014    Iodinated Diagnostic Agents [Iodinated Diagnostic Agents]     Januvia [Sitagliptin]     Metformin And Related [Metformin And Related]      H/a blurred vision    Mirtazapine Nausea Only   

## 2018-07-20 NOTE — PROGRESS NOTES
Dr. Jj Hu aware of positive stress test. Received positive result from Dr. Josesito Leslie, stating positive for anterior ischemia. Pt having back pain but also states she has back pack chronically. Physician aware. V.s remain stable. Hx of Cabg in 2015 per patient.

## 2018-07-20 NOTE — PROGRESS NOTES
glucagon (rDNA), dextrose, HYDROcodone 5 mg - acetaminophen **OR** HYDROcodone 5 mg - acetaminophen, nitroGLYCERIN, diazepam      Intake/Output Summary (Last 24 hours) at 07/20/18 1356  Last data filed at 07/20/18 1025   Gross per 24 hour   Intake              240 ml   Output             2325 ml   Net            -2085 ml       Physical Exam Performed:    BP (!) 154/70   Pulse 54   Temp 97.6 °F (36.4 °C) (Oral)   Resp 18   Ht 4' 11\" (1.499 m)   Wt 269 lb 1.6 oz (122.1 kg)   SpO2 98%   BMI 54.35 kg/m²     General appearance: Morbidly obese  No apparent distress, appears stated age and cooperative. HEENT: Pupils equal, round, and reactive to light. Conjunctivae/corneas clear. Neck: Supple, with full range of motion. No jugular venous distention. Trachea midline. Respiratory:  Normal respiratory effort. Clear to auscultation, bilaterally without Rales/Wheezes/Rhonchi. Cardiovascular: Regular rate and rhythm with normal S1/S2 without murmurs, rubs or gallops. Sternotomy scar present. Abdomen: Soft, non-tender, non-distended with normal bowel sounds. Musculoskeletal: No clubbing, cyanosis or edema bilaterally. Full range of motion without deformity. Skin: Skin color, texture, turgor normal.  No rashes or lesions. Hematoma left lower extremity  Neurologic:  Neurovascularly intact without any focal sensory/motor deficits.  Cranial nerves: II-XII intact, grossly non-focal.  Psychiatric: Alert and oriented, thought content appropriate, normal insight  Capillary Refill: Brisk,< 3 seconds   Peripheral Pulses: +2 palpable, equal bilaterally       Labs:   Recent Labs      07/18/18   1324  07/19/18   0516   WBC  7.2  6.2   HGB  8.6*  8.1*   HCT  27.4*  25.1*   PLT  203  186     Recent Labs      07/18/18   1324  07/19/18   0516   NA  138  143   K  5.2*  4.6   CL  98*  103   CO2  27  29   BUN  29*  27*   CREATININE  1.2  1.2   CALCIUM  9.3  9.0     Recent Labs      07/18/18   1324  07/19/18   0516   AST  15  16   ALT 9*  9*   BILIDIR  <0.2   --    BILITOT  <0.2  <0.2   ALKPHOS  106  97     No results for input(s): INR in the last 72 hours. Recent Labs      07/19/18   2145  07/20/18   0221  07/20/18   0539   TROPONINI  0.01  <0.01  <0.01       Urinalysis:      Lab Results   Component Value Date    NITRU POSITIVE 07/18/2018    WBCUA 3-5 07/18/2018    BACTERIA 2+ 07/18/2018    RBCUA None seen 07/18/2018    BLOODU TRACE-LYSED 07/18/2018    SPECGRAV 1.010 07/18/2018    GLUCOSEU Negative 07/18/2018       Radiology:  Stress/Rest NM Myocardial SPECT RX   Final Result      Large area reversible ischemia anterior wall from apex to base. Abnormal ejection fraction of 41%. Findings called as a critical result to the floor by Dr. Sindy Murillo at time of dictation 5:21 PM 7/19/2018. CT CHEST WO CONTRAST   Final Result      1. Small left pleural effusion. 2. Mild mediastinal lymphadenopathy which could be reactive or   neoplastic. 3. Cholelithiasis. 4. Soft tissue density in the retroperitoneum as seen previously   which could relate to previously treated lymphoma. Active lymphoma   be difficult to exclude. Interpreted by:   Shiv Garica MD      Signed by:   Shiv Garcia MD   7/19/18      Final result      XR CHEST STANDARD (2 VW)   Final Result      Impression: No acute process.       Interpreted by:   Kirill Grande MD      Signed by:   Kirill Grande MD   7/18/18      Final result              Assessment/Plan:    Active Hospital Problems    Diagnosis Date Noted    Abnormal myocardial perfusion study [R94.39] 07/20/2018    Hx of CABG [Z95.1] 07/19/2018    Chest pain [R07.9] 07/18/2018    Urinary tract infection associated with indwelling urethral catheter ([address] [T83.511A, N39.0] 83/13/9485    Follicular lymphoma grade IIIa of intrathoracic lymph nodes ([address] [C82.32] 12/14/2016    Essential hypertension [I10] 11/23/2013    Hypothyroidism [E03.9] 11/23/2013     Chest Pain:   - Atypical nature, concerning with hx of CAD with PCI and 2 BMS to LAD. Troponin trend negative. Abnormal NM Stress test, which shows large anterior region with reversible ischemia. - Cardiology evaluated the patient and s/p cardiac cath, was found to have preserved EF. Multivessel CAD, diffuse LAD apical dx but widely patent LIMA to LAD. SVG to PDA of RCA was found to be patent as well. - CT chest was done which showed mild pleural effusion, mediastinal LAD and soft tissue swelling in retroperitoneum. Hx of DLBCL: s/p R-CHOP rx; likely in remission  - will discuss with radiology about her imaging results  - Recent pleural fluid cytology was negative for malignancy  - LDH level wnl    UTI:   - Indwelling brito catheter due to urinary retension; UA on exam with nitrite and LE +ve  - Continue Nitrofurantoin    Hypothyroidism: Continue Synthroid 175 µg by mouth daily     Diabetes mellitus type 2 with hyperglycemia: Continue long-acting insulin as well as corrective sliding scale.     Hypertension:   - On scheduled Coreg 12.5 mg bid  - Hypertensive this evening  - Asked RN to give night dose early  - Added Hydralazine 25 mg PO q 8 hrs for SBP > 160    DVT Prophylaxis: Lovenox  Diet: Ok to resume diet post procedure  Code Status: Full Code    PT/OT Eval Status:     Dispo - Anticipate d/c 1-2 days. Patient post procedure.        Irina Oviedo MD

## 2018-07-20 NOTE — PLAN OF CARE
Problem: Falls - Risk of:  Goal: Will remain free from falls  Will remain free from falls   Outcome: Ongoing  Fall precautions are in place. Bed alarm is on and in lowest position. Pt is using call light appropriately. Will continue to monitor. Problem: Pain:  Goal: Control of chronic pain  Control of chronic pain   Outcome: Ongoing  Pt has complaints of generalized aching pain 7/10. PRN pain medication given per MD order. Nitroglycerin given for chest pain and relieved.

## 2018-07-20 NOTE — PROCEDURES
[address] [facility], [address] [facility]                              CARDIAC CATHETERIZATION    PATIENT NAME: Marilyn Gtz                :        1955  MED REC NO:   [id number]                          ROOM:       4499  ACCOUNT NO:   [de-identified]                           ADMIT DATE: 2018  PROVIDER:     Monica Morales MD    DATE OF PROCEDURE:  2018    PROCEDURE:  Left heart catheterization, coronary cineangiography, graft  cineangiography  (saphenous vein graft and LIMA graft). HISTORY:  The patient is a 26-year-old female who we were asked to see for  chest pain. She had prolonged discomfort and can occur at rest.  She had  negative enzymes. Because of her history of coronary artery disease and  her chest pain, she underwent Myoview stress testing. It did demonstrate a  large area _____ anterior ischemia. Because of her symptoms and abnormal  stress test, it was felt she should undergo catheterization. TECHNICAL PROCEDURE:  The patient was brought to cardiac catheterization  lab on 2018, where the left femoral artery was prepped and draped in  usual sterile fashion. After anesthetizing the area with 2% lidocaine, a  5-Czech sheath was placed in the left femoral artery using Seldinger  technique. Subsequently, left heart catheterization, left  ventriculography, and selective coronary cineangiography of both left and  right coronaries were performed in multiple projections. In addition,  graft cineangiography was performed (LIMA and saphenous vein graft). This  was performed using 5-Czech pigtail JL-4, JR-4 multipurpose AR1 and AL1  diagnostic catheters. The patient tolerated the procedure well. No  complications were encountered.   Multiple diagnostic catheters were used  since there was some difficulty finding the saphenous vein graft, but no  complications were encountered. RESULTS:  HEMODYNAMICS:  Left ventricular end-diastolic pressure equals 16. There is no significant gradient across the aortic valve by pullback post  cineangiography. LEFT VENTRICULOGRAM:  Left ventriculogram demonstrates uniform wall motion. Estimated ejection fraction is 50%. LEFT MAIN:  Left main is free of significant obstructive disease. LEFT ANTERIOR DESCENDING:  The LAD had 80% to 90% proximal stenosis. There  is moderate-to-heavy calcification. There is competitive flow from the  graft just beyond the stenosis. LEFT CIRCUMFLEX:  Circumflex consists essentially of a small marginal  branch and terminates in moderate-sized posterolateral branch. The small  marginal branch has ostial disease approaching 75% to 80%. Otherwise, the  circumflex is free of significant obstructive disease. RIGHT CORONARY ARTERY:  Right coronary artery is diffusely diseased in  proximal portion. There appears to be a stent in distal vessel where it  appears to be 90% stenotic. There is competitive flow in the PDA. LIMA TO THE LAD:  LIMA to the LAD is widely patent to the distal LAD. There is diffuse disease in the distal LAD. There is apical LAD to these  that approaches 75%. The entire apical LAD is diffusely diseased. SAPHENOUS VEIN GRAFT TO PDA:  Saphenous vein graft is widely patent to a  large PDA. The distal PDA is free of significant obstructive disease. IMPRESSION:  1. Preserved LV function with an estimated ejection fraction of 50%. 2.  Multivessel coronary artery disease as described above. 3.  Widely patent LIMA to LAD with diffuse apical LAD disease. 4.  Widely patent vein graft to the PDA of the right coronary artery. 5.  Recommend medical therapy.         Jovita Rizo MD    D: 07/20/2018 14:37:50       T: 07/20/2018 15:45:12     [email or web address]  Job#: [id number]     Doc#: [id number]    CC:

## 2018-07-21 LAB
ANION GAP SERPL CALCULATED.3IONS-SCNC: 12 MMOL/L (ref 3–16)
BASOPHILS ABSOLUTE: 0 K/UL (ref 0–0.2)
BASOPHILS RELATIVE PERCENT: 0.2 %
BUN BLDV-MCNC: 30 MG/DL (ref 7–20)
CALCIUM SERPL-MCNC: 9.2 MG/DL (ref 8.3–10.6)
CHLORIDE BLD-SCNC: 98 MMOL/L (ref 99–110)
CO2: 28 MMOL/L (ref 21–32)
CREAT SERPL-MCNC: 1.3 MG/DL (ref 0.6–1.2)
EOSINOPHILS ABSOLUTE: 0 K/UL (ref 0–0.6)
EOSINOPHILS RELATIVE PERCENT: 0 %
GFR AFRICAN AMERICAN: 50
GFR NON-AFRICAN AMERICAN: 41
GLUCOSE BLD-MCNC: 350 MG/DL (ref 70–99)
GLUCOSE BLD-MCNC: 356 MG/DL (ref 70–99)
GLUCOSE BLD-MCNC: 371 MG/DL (ref 70–99)
GLUCOSE BLD-MCNC: 382 MG/DL (ref 70–99)
GLUCOSE BLD-MCNC: 387 MG/DL (ref 70–99)
GLUCOSE BLD-MCNC: 529 MG/DL (ref 70–99)
HCT VFR BLD CALC: 25 % (ref 36–48)
HEMOGLOBIN: 8 G/DL (ref 12–16)
LYMPHOCYTES ABSOLUTE: 0.6 K/UL (ref 1–5.1)
LYMPHOCYTES RELATIVE PERCENT: 6.4 %
MCH RBC QN AUTO: 29 PG (ref 26–34)
MCHC RBC AUTO-ENTMCNC: 31.9 G/DL (ref 31–36)
MCV RBC AUTO: 90.9 FL (ref 80–100)
MONOCYTES ABSOLUTE: 0.4 K/UL (ref 0–1.3)
MONOCYTES RELATIVE PERCENT: 4 %
NEUTROPHILS ABSOLUTE: 8.4 K/UL (ref 1.7–7.7)
NEUTROPHILS RELATIVE PERCENT: 89.4 %
PDW BLD-RTO: 16.8 % (ref 12.4–15.4)
PERFORMED ON: ABNORMAL
PLATELET # BLD: 210 K/UL (ref 135–450)
PMV BLD AUTO: 9 FL (ref 5–10.5)
POTASSIUM SERPL-SCNC: 5.2 MMOL/L (ref 3.5–5.1)
RBC # BLD: 2.75 M/UL (ref 4–5.2)
SODIUM BLD-SCNC: 138 MMOL/L (ref 136–145)
WBC # BLD: 9.4 K/UL (ref 4–11)

## 2018-07-21 PROCEDURE — 6360000002 HC RX W HCPCS: Performed by: INTERNAL MEDICINE

## 2018-07-21 PROCEDURE — 85025 COMPLETE CBC W/AUTO DIFF WBC: CPT

## 2018-07-21 PROCEDURE — 94664 DEMO&/EVAL PT USE INHALER: CPT

## 2018-07-21 PROCEDURE — 2700000000 HC OXYGEN THERAPY PER DAY

## 2018-07-21 PROCEDURE — G0378 HOSPITAL OBSERVATION PER HR: HCPCS

## 2018-07-21 PROCEDURE — 36415 COLL VENOUS BLD VENIPUNCTURE: CPT

## 2018-07-21 PROCEDURE — 6370000000 HC RX 637 (ALT 250 FOR IP): Performed by: INTERNAL MEDICINE

## 2018-07-21 PROCEDURE — 94640 AIRWAY INHALATION TREATMENT: CPT

## 2018-07-21 PROCEDURE — 80048 BASIC METABOLIC PNL TOTAL CA: CPT

## 2018-07-21 PROCEDURE — 99232 SBSQ HOSP IP/OBS MODERATE 35: CPT | Performed by: INTERNAL MEDICINE

## 2018-07-21 PROCEDURE — 94761 N-INVAS EAR/PLS OXIMETRY MLT: CPT

## 2018-07-21 PROCEDURE — 2580000003 HC RX 258: Performed by: INTERNAL MEDICINE

## 2018-07-21 PROCEDURE — 36600 WITHDRAWAL OF ARTERIAL BLOOD: CPT

## 2018-07-21 RX ORDER — CARVEDILOL 25 MG/1
25 TABLET ORAL 2 TIMES DAILY
Status: DISCONTINUED | OUTPATIENT
Start: 2018-07-21 | End: 2018-07-25 | Stop reason: HOSPADM

## 2018-07-21 RX ADMIN — INSULIN LISPRO 2 UNITS: 100 INJECTION, SOLUTION INTRAVENOUS; SUBCUTANEOUS at 09:26

## 2018-07-21 RX ADMIN — HYDRALAZINE HYDROCHLORIDE 25 MG: 25 TABLET, FILM COATED ORAL at 15:57

## 2018-07-21 RX ADMIN — LEVOTHYROXINE SODIUM 175 MCG: 150 TABLET ORAL at 07:06

## 2018-07-21 RX ADMIN — CARVEDILOL 12.5 MG: 12.5 TABLET, FILM COATED ORAL at 09:24

## 2018-07-21 RX ADMIN — ASPIRIN 81 MG: 81 TABLET, COATED ORAL at 09:24

## 2018-07-21 RX ADMIN — Medication 10 ML: at 09:24

## 2018-07-21 RX ADMIN — ENOXAPARIN SODIUM 40 MG: 40 INJECTION SUBCUTANEOUS at 09:24

## 2018-07-21 RX ADMIN — OXYCODONE HYDROCHLORIDE AND ACETAMINOPHEN 1 TABLET: 5; 325 TABLET ORAL at 02:58

## 2018-07-21 RX ADMIN — OXYCODONE HYDROCHLORIDE AND ACETAMINOPHEN 1 TABLET: 5; 325 TABLET ORAL at 22:39

## 2018-07-21 RX ADMIN — MORPHINE SULFATE 2 MG: 2 INJECTION, SOLUTION INTRAMUSCULAR; INTRAVENOUS at 04:55

## 2018-07-21 RX ADMIN — ONDANSETRON 4 MG: 2 INJECTION INTRAMUSCULAR; INTRAVENOUS at 00:35

## 2018-07-21 RX ADMIN — ALBUTEROL SULFATE 2.5 MG: 2.5 SOLUTION RESPIRATORY (INHALATION) at 09:21

## 2018-07-21 RX ADMIN — CARVEDILOL 25 MG: 25 TABLET, FILM COATED ORAL at 20:57

## 2018-07-21 RX ADMIN — MORPHINE SULFATE 2 MG: 2 INJECTION, SOLUTION INTRAMUSCULAR; INTRAVENOUS at 09:46

## 2018-07-21 RX ADMIN — OXYCODONE HYDROCHLORIDE AND ACETAMINOPHEN 1 TABLET: 5; 325 TABLET ORAL at 16:55

## 2018-07-21 RX ADMIN — NITROFURANTOIN MACROCRYSTALS 50 MG: 50 CAPSULE ORAL at 00:35

## 2018-07-21 RX ADMIN — NITROFURANTOIN MACROCRYSTALS 50 MG: 50 CAPSULE ORAL at 20:57

## 2018-07-21 RX ADMIN — INSULIN LISPRO 10 UNITS: 100 INJECTION, SOLUTION INTRAVENOUS; SUBCUTANEOUS at 17:49

## 2018-07-21 RX ADMIN — NITROFURANTOIN MACROCRYSTALS 50 MG: 50 CAPSULE ORAL at 15:57

## 2018-07-21 RX ADMIN — NITROFURANTOIN MACROCRYSTALS 50 MG: 50 CAPSULE ORAL at 07:06

## 2018-07-21 RX ADMIN — Medication 10 ML: at 20:57

## 2018-07-21 RX ADMIN — OXYCODONE HYDROCHLORIDE AND ACETAMINOPHEN 1 TABLET: 5; 325 TABLET ORAL at 12:32

## 2018-07-21 RX ADMIN — MORPHINE SULFATE 2 MG: 2 INJECTION, SOLUTION INTRAMUSCULAR; INTRAVENOUS at 20:57

## 2018-07-21 RX ADMIN — GABAPENTIN 100 MG: 100 CAPSULE ORAL at 09:25

## 2018-07-21 RX ADMIN — ALBUTEROL SULFATE 2.5 MG: 2.5 SOLUTION RESPIRATORY (INHALATION) at 14:03

## 2018-07-21 RX ADMIN — OXYCODONE HYDROCHLORIDE AND ACETAMINOPHEN 1 TABLET: 5; 325 TABLET ORAL at 07:08

## 2018-07-21 RX ADMIN — ATORVASTATIN CALCIUM 40 MG: 40 TABLET, FILM COATED ORAL at 20:56

## 2018-07-21 RX ADMIN — ONDANSETRON 4 MG: 2 INJECTION INTRAMUSCULAR; INTRAVENOUS at 05:07

## 2018-07-21 RX ADMIN — INSULIN LISPRO 5 UNITS: 100 INJECTION, SOLUTION INTRAVENOUS; SUBCUTANEOUS at 20:58

## 2018-07-21 RX ADMIN — GABAPENTIN 100 MG: 100 CAPSULE ORAL at 15:57

## 2018-07-21 RX ADMIN — HYDRALAZINE HYDROCHLORIDE 25 MG: 25 TABLET, FILM COATED ORAL at 20:56

## 2018-07-21 RX ADMIN — INSULIN LISPRO 10 UNITS: 100 INJECTION, SOLUTION INTRAVENOUS; SUBCUTANEOUS at 12:29

## 2018-07-21 RX ADMIN — ALBUTEROL SULFATE 2.5 MG: 2.5 SOLUTION RESPIRATORY (INHALATION) at 20:28

## 2018-07-21 RX ADMIN — GABAPENTIN 100 MG: 100 CAPSULE ORAL at 20:56

## 2018-07-21 ASSESSMENT — PAIN DESCRIPTION - FREQUENCY
FREQUENCY: CONTINUOUS

## 2018-07-21 ASSESSMENT — PAIN DESCRIPTION - LOCATION
LOCATION: BACK;GROIN
LOCATION: GROIN;LEG
LOCATION: BACK
LOCATION: BACK
LOCATION: BACK;GROIN

## 2018-07-21 ASSESSMENT — PAIN DESCRIPTION - ORIENTATION
ORIENTATION: LEFT;LOWER
ORIENTATION: LOWER
ORIENTATION: LOWER
ORIENTATION: LEFT;LOWER
ORIENTATION: LEFT

## 2018-07-21 ASSESSMENT — PAIN SCALES - GENERAL
PAINLEVEL_OUTOF10: 7
PAINLEVEL_OUTOF10: 8
PAINLEVEL_OUTOF10: 9
PAINLEVEL_OUTOF10: 8
PAINLEVEL_OUTOF10: 8
PAINLEVEL_OUTOF10: 0
PAINLEVEL_OUTOF10: 7
PAINLEVEL_OUTOF10: 7
PAINLEVEL_OUTOF10: 5
PAINLEVEL_OUTOF10: 5
PAINLEVEL_OUTOF10: 7
PAINLEVEL_OUTOF10: 10

## 2018-07-21 ASSESSMENT — PAIN DESCRIPTION - DESCRIPTORS
DESCRIPTORS: ACHING
DESCRIPTORS: PRESSURE
DESCRIPTORS: PRESSURE
DESCRIPTORS: BURNING;DISCOMFORT

## 2018-07-21 ASSESSMENT — PAIN DESCRIPTION - PAIN TYPE
TYPE: ACUTE PAIN

## 2018-07-21 ASSESSMENT — PAIN DESCRIPTION - PROGRESSION
CLINICAL_PROGRESSION: NOT CHANGED
CLINICAL_PROGRESSION: NOT CHANGED

## 2018-07-21 ASSESSMENT — PAIN DESCRIPTION - ONSET
ONSET: ON-GOING

## 2018-07-21 NOTE — PROGRESS NOTES
[address] Daily Progress Note      Admit Date:  7/18/2018    Subjective:  Ms. Alvino Pierce was seen and examined. F/U chest pain/CAD/CABG/abn myoview. No chest pain. Only complains of chronic back pain. Objective:   BP (!) 155/59   Pulse 74   Temp 97 °F (36.1 °C) (Oral)   Resp 18   Ht 4' 11\" (1.499 m)   Wt 216 lb 11.2 oz (98.3 kg)   SpO2 98%   BMI 43.77 kg/m²       Intake/Output Summary (Last 24 hours) at 07/21/18 8820  Last data filed at 07/21/18 0300   Gross per 24 hour   Intake              210 ml   Output             1925 ml   Net            -1715 ml       TELEMETRY: Sinus     Physical Exam:  General:  Awake, alert, NAD  Skin:  Warm and dry  Neck:  JVP difficult to assess  Chest:  Decreased BS, respiration normal  Cardiovascular:  RRR S1S2  Abdomen:  Soft nontender  Extremities:  tr edema, site good. Soft.      Medications:    hydrALAZINE  25 mg Oral 3 times per day    aspirin  81 mg Oral Daily    atorvastatin  40 mg Oral Nightly    carvedilol  12.5 mg Oral BID    gabapentin  100 mg Oral TID    insulin detemir  50 Units Subcutaneous BID    levothyroxine  175 mcg Oral QAM AC    sodium chloride flush  10 mL Intravenous 2 times per day    insulin lispro  0-12 Units Subcutaneous TID WC    insulin lispro  0-6 Units Subcutaneous Nightly    enoxaparin  40 mg Subcutaneous Daily    nitrofurantoin  50 mg Oral 4 times per day    albuterol  2.5 mg Nebulization TID      dextrose       acetaminophen, morphine **OR** morphine, oxyCODONE-acetaminophen, albuterol, sodium chloride flush, acetaminophen, magnesium hydroxide, ondansetron, glucose, dextrose, glucagon (rDNA), dextrose, nitroGLYCERIN, diazepam    Lab Data:  CBC:   Recent Labs      07/18/18   1324  07/19/18   0516   WBC  7.2  6.2   HGB  8.6*  8.1*   HCT  27.4*  25.1*   MCV  92.3  91.1   PLT  203  186     BMP:   Recent Labs      07/18/18   1324  07/19/18   0516   NA  138  143   K  5.2*  4.6   CL  98*  103   CO2  27  29   BUN  29*  27*

## 2018-07-21 NOTE — PROGRESS NOTES
Subcutaneous Nightly    enoxaparin  40 mg Subcutaneous Daily    nitrofurantoin  50 mg Oral 4 times per day    albuterol  2.5 mg Nebulization TID     PRN Meds: acetaminophen, morphine **OR** morphine, oxyCODONE-acetaminophen, albuterol, sodium chloride flush, acetaminophen, magnesium hydroxide, ondansetron, glucose, dextrose, glucagon (rDNA), dextrose, nitroGLYCERIN, diazepam      Intake/Output Summary (Last 24 hours) at 07/21/18 1127  Last data filed at 07/21/18 0457   Gross per 24 hour   Intake              210 ml   Output             1250 ml   Net            -1040 ml       Physical Exam Performed:    BP (!) 197/88   Pulse 84   Temp 97.3 °F (36.3 °C) (Oral)   Resp 22   Ht 4' 11\" (1.499 m)   Wt 216 lb 11.2 oz (98.3 kg)   SpO2 98%   BMI 43.77 kg/m²     General appearance: Morbidly obese  No apparent distress, appears stated age and cooperative. HEENT: Pupils equal, round, and reactive to light. Conjunctivae/corneas clear. Neck: Supple, with full range of motion. No jugular venous distention. Trachea midline. Respiratory:  Normal respiratory effort. Clear to auscultation, bilaterally without Rales/Wheezes/Rhonchi. Cardiovascular: Regular rate and rhythm with normal S1/S2 without murmurs, rubs or gallops. Sternotomy scar present. Abdomen: Soft, non-tender, non-distended with normal bowel sounds. Musculoskeletal: No clubbing, cyanosis or edema bilaterally. Full range of motion without deformity. Skin: Skin color, texture, turgor normal.  No rashes or lesions. Hematoma left lower extremity  Neurologic:  Neurovascularly intact without any focal sensory/motor deficits.  Cranial nerves: II-XII intact, grossly non-focal.  Psychiatric: Alert and oriented, thought content appropriate, normal insight  Capillary Refill: Brisk,< 3 seconds   Peripheral Pulses: +2 palpable, equal bilaterally       Labs:   Recent Labs      07/18/18   1324  07/19/18   0516   WBC  7.2  6.2   HGB  8.6*  8.1*   HCT  27.4*  25.1*   PLT 203  186     Recent Labs      07/18/18   1324  07/19/18   0516   NA  138  143   K  5.2*  4.6   CL  98*  103   CO2  27  29   BUN  29*  27*   CREATININE  1.2  1.2   CALCIUM  9.3  9.0     Recent Labs      07/18/18   1324  07/19/18   0516   AST  15  16   ALT  9*  9*   BILIDIR  <0.2   --    BILITOT  <0.2  <0.2   ALKPHOS  106  97     No results for input(s): INR in the last 72 hours. Recent Labs      07/19/18   2145  07/20/18   0221  07/20/18   0539   TROPONINI  0.01  <0.01  <0.01       Urinalysis:      Lab Results   Component Value Date    NITRU POSITIVE 07/18/2018    WBCUA 3-5 07/18/2018    BACTERIA 2+ 07/18/2018    RBCUA None seen 07/18/2018    BLOODU TRACE-LYSED 07/18/2018    SPECGRAV 1.010 07/18/2018    GLUCOSEU Negative 07/18/2018       Radiology:  Stress/Rest NM Myocardial SPECT RX   Final Result      Large area reversible ischemia anterior wall from apex to base. Abnormal ejection fraction of 41%. Findings called as a critical result to the floor by Dr. Chelo Zavala at time of dictation 5:21 PM 7/19/2018. CT CHEST WO CONTRAST   Final Result      1. Small left pleural effusion. 2. Mild mediastinal lymphadenopathy which could be reactive or   neoplastic. 3. Cholelithiasis. 4. Soft tissue density in the retroperitoneum as seen previously   which could relate to previously treated lymphoma. Active lymphoma   be difficult to exclude. Interpreted by:   Judah Cohen MD      Signed by:   Judah Cohen MD   7/19/18      Final result      XR CHEST STANDARD (2 VW)   Final Result      Impression: No acute process.       Interpreted by:   Shane Lau MD      Signed by:   Shane Lau MD   7/18/18      Final result              Assessment/Plan:    Active Hospital Problems    Diagnosis Date Noted    Abnormal myocardial perfusion study [R94.39] 07/20/2018    Hx of CABG [Z95.1] 07/19/2018    Chest pain [R07.9] 07/18/2018    Urinary tract infection associated with indwelling urethral catheter

## 2018-07-21 NOTE — PROGRESS NOTES
Pt called RN to room and is complaining of Left groin pain. RN assessed L groin puncture and pt has developed a hematoma. Greg Gray paged and states to give pt 1-2 mg moprhine Q 4 hours for pain and hold manual pressure for 20 minutes. Manual pressure held by Sofi Reilly RN and hematoma has resolved and groin is soft and VSS at this time. VS checked for post cath restarted and bedrest will be for 4 hours.

## 2018-07-22 PROBLEM — I24.9 ACS (ACUTE CORONARY SYNDROME) (HCC): Status: ACTIVE | Noted: 2018-07-22

## 2018-07-22 LAB
EKG ATRIAL RATE: 73 BPM
EKG DIAGNOSIS: NORMAL
EKG P AXIS: 88 DEGREES
EKG P-R INTERVAL: 188 MS
EKG Q-T INTERVAL: 394 MS
EKG QRS DURATION: 92 MS
EKG QTC CALCULATION (BAZETT): 434 MS
EKG R AXIS: -58 DEGREES
EKG T AXIS: -4 DEGREES
EKG VENTRICULAR RATE: 73 BPM
GLUCOSE BLD-MCNC: 203 MG/DL (ref 70–99)
GLUCOSE BLD-MCNC: 206 MG/DL (ref 70–99)
GLUCOSE BLD-MCNC: 218 MG/DL (ref 70–99)
GLUCOSE BLD-MCNC: 249 MG/DL (ref 70–99)
PERFORMED ON: ABNORMAL

## 2018-07-22 PROCEDURE — 6370000000 HC RX 637 (ALT 250 FOR IP): Performed by: INTERNAL MEDICINE

## 2018-07-22 PROCEDURE — 6360000002 HC RX W HCPCS: Performed by: INTERNAL MEDICINE

## 2018-07-22 PROCEDURE — 2580000003 HC RX 258: Performed by: INTERNAL MEDICINE

## 2018-07-22 PROCEDURE — 94640 AIRWAY INHALATION TREATMENT: CPT

## 2018-07-22 PROCEDURE — 2700000000 HC OXYGEN THERAPY PER DAY

## 2018-07-22 PROCEDURE — 94761 N-INVAS EAR/PLS OXIMETRY MLT: CPT

## 2018-07-22 PROCEDURE — G0378 HOSPITAL OBSERVATION PER HR: HCPCS

## 2018-07-22 PROCEDURE — 6370000000 HC RX 637 (ALT 250 FOR IP): Performed by: FAMILY MEDICINE

## 2018-07-22 PROCEDURE — 1200000000 HC SEMI PRIVATE

## 2018-07-22 PROCEDURE — 99233 SBSQ HOSP IP/OBS HIGH 50: CPT | Performed by: INTERNAL MEDICINE

## 2018-07-22 RX ORDER — NITROFURANTOIN MACROCRYSTALS 50 MG/1
50 CAPSULE ORAL EVERY 6 HOURS SCHEDULED
Status: COMPLETED | OUTPATIENT
Start: 2018-07-22 | End: 2018-07-22

## 2018-07-22 RX ADMIN — GABAPENTIN 100 MG: 100 CAPSULE ORAL at 17:53

## 2018-07-22 RX ADMIN — NITROFURANTOIN MACROCRYSTALS 50 MG: 50 CAPSULE ORAL at 15:23

## 2018-07-22 RX ADMIN — ONDANSETRON 4 MG: 2 INJECTION INTRAMUSCULAR; INTRAVENOUS at 21:41

## 2018-07-22 RX ADMIN — OXYCODONE HYDROCHLORIDE AND ACETAMINOPHEN 1 TABLET: 5; 325 TABLET ORAL at 12:02

## 2018-07-22 RX ADMIN — ONDANSETRON 4 MG: 2 INJECTION INTRAMUSCULAR; INTRAVENOUS at 11:54

## 2018-07-22 RX ADMIN — ATORVASTATIN CALCIUM 40 MG: 40 TABLET, FILM COATED ORAL at 21:36

## 2018-07-22 RX ADMIN — INSULIN LISPRO 2 UNITS: 100 INJECTION, SOLUTION INTRAVENOUS; SUBCUTANEOUS at 21:36

## 2018-07-22 RX ADMIN — INSULIN LISPRO 4 UNITS: 100 INJECTION, SOLUTION INTRAVENOUS; SUBCUTANEOUS at 17:53

## 2018-07-22 RX ADMIN — DIAZEPAM 2.5 MG: 5 TABLET ORAL at 21:48

## 2018-07-22 RX ADMIN — OXYCODONE HYDROCHLORIDE AND ACETAMINOPHEN 1 TABLET: 5; 325 TABLET ORAL at 17:53

## 2018-07-22 RX ADMIN — NITROFURANTOIN MACROCRYSTALS 50 MG: 50 CAPSULE ORAL at 04:29

## 2018-07-22 RX ADMIN — HYDRALAZINE HYDROCHLORIDE 25 MG: 25 TABLET, FILM COATED ORAL at 04:28

## 2018-07-22 RX ADMIN — ASPIRIN 81 MG: 81 TABLET, COATED ORAL at 09:59

## 2018-07-22 RX ADMIN — GABAPENTIN 100 MG: 100 CAPSULE ORAL at 11:54

## 2018-07-22 RX ADMIN — ALBUTEROL SULFATE 2.5 MG: 2.5 SOLUTION RESPIRATORY (INHALATION) at 19:49

## 2018-07-22 RX ADMIN — Medication 10 ML: at 21:45

## 2018-07-22 RX ADMIN — ALBUTEROL SULFATE 2.5 MG: 2.5 SOLUTION RESPIRATORY (INHALATION) at 14:10

## 2018-07-22 RX ADMIN — MORPHINE SULFATE 2 MG: 2 INJECTION, SOLUTION INTRAMUSCULAR; INTRAVENOUS at 15:23

## 2018-07-22 RX ADMIN — INSULIN LISPRO 4 UNITS: 100 INJECTION, SOLUTION INTRAVENOUS; SUBCUTANEOUS at 09:59

## 2018-07-22 RX ADMIN — OXYCODONE HYDROCHLORIDE AND ACETAMINOPHEN 1 TABLET: 5; 325 TABLET ORAL at 04:28

## 2018-07-22 RX ADMIN — LEVOTHYROXINE SODIUM 175 MCG: 150 TABLET ORAL at 04:30

## 2018-07-22 RX ADMIN — CARVEDILOL 25 MG: 25 TABLET, FILM COATED ORAL at 21:36

## 2018-07-22 RX ADMIN — NITROFURANTOIN MACROCRYSTALS 50 MG: 50 CAPSULE ORAL at 21:47

## 2018-07-22 RX ADMIN — GABAPENTIN 100 MG: 100 CAPSULE ORAL at 21:47

## 2018-07-22 RX ADMIN — OXYCODONE HYDROCHLORIDE AND ACETAMINOPHEN 1 TABLET: 5; 325 TABLET ORAL at 21:48

## 2018-07-22 RX ADMIN — HYDRALAZINE HYDROCHLORIDE 25 MG: 25 TABLET, FILM COATED ORAL at 21:36

## 2018-07-22 RX ADMIN — HYDRALAZINE HYDROCHLORIDE 25 MG: 25 TABLET, FILM COATED ORAL at 15:22

## 2018-07-22 RX ADMIN — ENOXAPARIN SODIUM 40 MG: 40 INJECTION SUBCUTANEOUS at 00:00

## 2018-07-22 RX ADMIN — MORPHINE SULFATE 2 MG: 2 INJECTION, SOLUTION INTRAMUSCULAR; INTRAVENOUS at 09:59

## 2018-07-22 RX ADMIN — MORPHINE SULFATE 2 MG: 2 INJECTION, SOLUTION INTRAMUSCULAR; INTRAVENOUS at 00:23

## 2018-07-22 RX ADMIN — INSULIN LISPRO 4 UNITS: 100 INJECTION, SOLUTION INTRAVENOUS; SUBCUTANEOUS at 11:54

## 2018-07-22 RX ADMIN — CARVEDILOL 25 MG: 25 TABLET, FILM COATED ORAL at 09:59

## 2018-07-22 ASSESSMENT — PAIN DESCRIPTION - LOCATION
LOCATION: GROIN;LEG
LOCATION: GROIN;LEG
LOCATION: GROIN
LOCATION: GROIN;ABDOMEN

## 2018-07-22 ASSESSMENT — PAIN SCALES - GENERAL
PAINLEVEL_OUTOF10: 8
PAINLEVEL_OUTOF10: 9
PAINLEVEL_OUTOF10: 4
PAINLEVEL_OUTOF10: 3
PAINLEVEL_OUTOF10: 3
PAINLEVEL_OUTOF10: 7
PAINLEVEL_OUTOF10: 8
PAINLEVEL_OUTOF10: 5
PAINLEVEL_OUTOF10: 3
PAINLEVEL_OUTOF10: 8
PAINLEVEL_OUTOF10: 10

## 2018-07-22 ASSESSMENT — PAIN DESCRIPTION - PROGRESSION
CLINICAL_PROGRESSION: NOT CHANGED

## 2018-07-22 ASSESSMENT — PAIN DESCRIPTION - ONSET
ONSET: ON-GOING
ONSET: AWAKENED FROM SLEEP
ONSET: ON-GOING
ONSET: AWAKENED FROM SLEEP

## 2018-07-22 ASSESSMENT — PAIN DESCRIPTION - DESCRIPTORS
DESCRIPTORS: DISCOMFORT
DESCRIPTORS: BURNING;DISCOMFORT

## 2018-07-22 ASSESSMENT — PAIN DESCRIPTION - FREQUENCY
FREQUENCY: CONTINUOUS

## 2018-07-22 ASSESSMENT — PAIN DESCRIPTION - ORIENTATION
ORIENTATION: LEFT

## 2018-07-22 ASSESSMENT — PAIN DESCRIPTION - PAIN TYPE
TYPE: ACUTE PAIN

## 2018-07-22 NOTE — CARE COORDINATION
2018  [facility])  Clinical Case Management Department    Patient: Myles Laura  MRN: [id number] / : 1955  ACCT: [de-identified]     Emergency Contacts  Contact 1: Name: Braeden Bains  Contact 1: Number: [phone]  Contact 1: Relationship: uncle    Admission Documentation  Attending Provider: Jozef Quinn MD  Admit date/time: 2018 12:35 PM  Status: Inpatient  Diagnosis: ACS (acute coronary syndrome) ([facility] [address]     Readmission within last 30 days:  no     Living Situation  Discharge Planning  [address]: [facility]  Living Arrangements: [address] [place] [address] Drive: Family Members, /  Potential Assistance Needed: Extended Care Placement  Type of [address], [place], [address]  Patient expects to be discharged to[de-identified] [address]  Expected Discharge Date: 18    Service Assessment:       Values / Beliefs  Do you have any ethnic, cultural, sacramental, or spiritual Orthodoxy needs you would like us to be aware of while you are in the hospital?: No    Advance Directives (For Healthcare)  Pre-existing DNR Comfort Care/DNR Arrest/DNI Order: Yes, notify physician for order  Healthcare Directive: No, patient does not have an advance directive for healthcare treatment  Information on Healthcare Directives Requested: No  Patient Requests Assistance: No  Advance Directives: Pt. not interested at this time    Pharmacy: [facility] is able to fill patients medications   Potential Assistance Purchasing Medications:  No  Does patient want to participate in local refill/meds to beds program?: No    Notification completed in HENS/PAS? No     Discharge disposition: SNF:[facility] Phone: [phone] Fax: [phone]       Ancillary: [facility] transport arranged for 1pm discharge back to long term care facility [facility].     Mitch Lipscomb and her family were provided with choice of provider; she and her family are in agreement with the discharge plan.       Care Transitions patient: Kinza Francis RN  [facility]  Case Management Department  Ph: [phone] Fax: [phone]

## 2018-07-22 NOTE — PLAN OF CARE
Problem: Falls - Risk of:  Goal: Will remain free from falls  Will remain free from falls   Outcome: Ongoing  Pt is a Fall Risk. See Belisamar Dick Fall Risk Score. Pt bed in low position and side rails up. Call light and belongings in reach. Pt encouraged to call for assistance. Will continue with hourly rounds for PO intake, pain needs, toileting, and repositioning as needed.

## 2018-07-22 NOTE — PROGRESS NOTES
Fall 10/03/2016    Pleural effusion 10/03/2016    Hydradenitis 05/19/2016    Pyelonephritis 02/10/2016    Hydradenitis 02/01/2015    Hypertension, well controlled     Chronic respiratory failure, unspecified whether with hypoxia or hypercapnia ([facility] [address] 12/03/2014    Normocytic anemia 12/03/2014    Shortness of breath 11/30/2014    PNA (pneumonia) 11/30/2014    Calf pain 11/30/2014    Respiratory failure ([address] 10/03/2014    NSTEMI (non-ST elevated myocardial infarction) ([facility] [address] 10/01/2014    Essential hypertension 11/23/2013    Hypothyroidism 11/23/2013    Back pain 11/23/2013       Plan:  No chest pain. Site she complains of pain. No definite hematoma. No bruit. No marked bruising. Watch with activities.      Core Measures:  · Discharge instructions:   · LVEF documented:   · ACEI for LV dysfunction:   · Smoking Cessation:    Tere Simeon MD 7/22/2018 9:02 AM

## 2018-07-23 ENCOUNTER — APPOINTMENT (OUTPATIENT)
Dept: VASCULAR LAB | Age: 63
DRG: 191 | End: 2018-07-23
Payer: MEDICAID

## 2018-07-23 ENCOUNTER — APPOINTMENT (OUTPATIENT)
Dept: ULTRASOUND IMAGING | Age: 63
DRG: 191 | End: 2018-07-23
Payer: MEDICAID

## 2018-07-23 LAB
ALBUMIN SERPL-MCNC: 3.5 G/DL (ref 3.4–5)
ANION GAP SERPL CALCULATED.3IONS-SCNC: 7 MMOL/L (ref 3–16)
BUN BLDV-MCNC: 34 MG/DL (ref 7–20)
C3 COMPLEMENT: 157.6 MG/DL (ref 90–180)
C4 COMPLEMENT: 35.7 MG/DL (ref 10–40)
CALCIUM SERPL-MCNC: 9.1 MG/DL (ref 8.3–10.6)
CHLORIDE BLD-SCNC: 99 MMOL/L (ref 99–110)
CO2: 33 MMOL/L (ref 21–32)
CREAT SERPL-MCNC: 1.4 MG/DL (ref 0.6–1.2)
FERRITIN: 321 NG/ML (ref 15–150)
GFR AFRICAN AMERICAN: 46
GFR NON-AFRICAN AMERICAN: 38
GLUCOSE BLD-MCNC: 183 MG/DL (ref 70–99)
GLUCOSE BLD-MCNC: 192 MG/DL (ref 70–99)
GLUCOSE BLD-MCNC: 215 MG/DL (ref 70–99)
GLUCOSE BLD-MCNC: 251 MG/DL (ref 70–99)
GLUCOSE BLD-MCNC: 258 MG/DL (ref 70–99)
HCT VFR BLD CALC: 24.2 % (ref 36–48)
HEMOGLOBIN: 7.6 G/DL (ref 12–16)
IRON SATURATION: 14 % (ref 15–50)
IRON: 35 UG/DL (ref 37–145)
MCH RBC QN AUTO: 29.3 PG (ref 26–34)
MCHC RBC AUTO-ENTMCNC: 31.4 G/DL (ref 31–36)
MCV RBC AUTO: 93.3 FL (ref 80–100)
PDW BLD-RTO: 17.5 % (ref 12.4–15.4)
PERFORMED ON: ABNORMAL
PHOSPHORUS: 3.9 MG/DL (ref 2.5–4.9)
PLATELET # BLD: 186 K/UL (ref 135–450)
PMV BLD AUTO: 9.4 FL (ref 5–10.5)
POTASSIUM SERPL-SCNC: 5 MMOL/L (ref 3.5–5.1)
RBC # BLD: 2.59 M/UL (ref 4–5.2)
SODIUM BLD-SCNC: 139 MMOL/L (ref 136–145)
TOTAL IRON BINDING CAPACITY: 248 UG/DL (ref 260–445)
WBC # BLD: 9.2 K/UL (ref 4–11)

## 2018-07-23 PROCEDURE — 6360000002 HC RX W HCPCS: Performed by: INTERNAL MEDICINE

## 2018-07-23 PROCEDURE — 2580000003 HC RX 258: Performed by: INTERNAL MEDICINE

## 2018-07-23 PROCEDURE — 6370000000 HC RX 637 (ALT 250 FOR IP): Performed by: INTERNAL MEDICINE

## 2018-07-23 PROCEDURE — 93926 LOWER EXTREMITY STUDY: CPT

## 2018-07-23 PROCEDURE — 2700000000 HC OXYGEN THERAPY PER DAY

## 2018-07-23 PROCEDURE — 94640 AIRWAY INHALATION TREATMENT: CPT

## 2018-07-23 PROCEDURE — 86160 COMPLEMENT ANTIGEN: CPT

## 2018-07-23 PROCEDURE — 94762 N-INVAS EAR/PLS OXIMTRY CONT: CPT

## 2018-07-23 PROCEDURE — 80069 RENAL FUNCTION PANEL: CPT

## 2018-07-23 PROCEDURE — 83550 IRON BINDING TEST: CPT

## 2018-07-23 PROCEDURE — 82728 ASSAY OF FERRITIN: CPT

## 2018-07-23 PROCEDURE — 36415 COLL VENOUS BLD VENIPUNCTURE: CPT

## 2018-07-23 PROCEDURE — 83540 ASSAY OF IRON: CPT

## 2018-07-23 PROCEDURE — 99232 SBSQ HOSP IP/OBS MODERATE 35: CPT | Performed by: INTERNAL MEDICINE

## 2018-07-23 PROCEDURE — 2000000000 HC ICU R&B

## 2018-07-23 PROCEDURE — 85027 COMPLETE CBC AUTOMATED: CPT

## 2018-07-23 PROCEDURE — 6360000002 HC RX W HCPCS: Performed by: SURGERY

## 2018-07-23 RX ORDER — SODIUM CHLORIDE 9 MG/ML
INJECTION, SOLUTION INTRAVENOUS CONTINUOUS
Status: DISPENSED | OUTPATIENT
Start: 2018-07-23 | End: 2018-07-24

## 2018-07-23 RX ORDER — CASTOR OIL AND BALSAM, PERU 788; 87 MG/G; MG/G
OINTMENT TOPICAL 2 TIMES DAILY
Status: DISCONTINUED | OUTPATIENT
Start: 2018-07-23 | End: 2018-07-25 | Stop reason: HOSPADM

## 2018-07-23 RX ADMIN — LEVOTHYROXINE SODIUM 175 MCG: 150 TABLET ORAL at 06:26

## 2018-07-23 RX ADMIN — OXYCODONE HYDROCHLORIDE AND ACETAMINOPHEN 1 TABLET: 5; 325 TABLET ORAL at 17:33

## 2018-07-23 RX ADMIN — CASTOR OIL AND BALSAM, PERU: 788; 87 OINTMENT TOPICAL at 22:41

## 2018-07-23 RX ADMIN — INSULIN LISPRO 6 UNITS: 100 INJECTION, SOLUTION INTRAVENOUS; SUBCUTANEOUS at 17:33

## 2018-07-23 RX ADMIN — INSULIN LISPRO 6 UNITS: 100 INJECTION, SOLUTION INTRAVENOUS; SUBCUTANEOUS at 11:43

## 2018-07-23 RX ADMIN — ALBUTEROL SULFATE 2.5 MG: 2.5 SOLUTION RESPIRATORY (INHALATION) at 05:59

## 2018-07-23 RX ADMIN — OXYCODONE HYDROCHLORIDE AND ACETAMINOPHEN 1 TABLET: 5; 325 TABLET ORAL at 22:44

## 2018-07-23 RX ADMIN — GABAPENTIN 100 MG: 100 CAPSULE ORAL at 14:54

## 2018-07-23 RX ADMIN — MORPHINE SULFATE 2 MG: 2 INJECTION, SOLUTION INTRAMUSCULAR; INTRAVENOUS at 14:54

## 2018-07-23 RX ADMIN — Medication 10 ML: at 09:25

## 2018-07-23 RX ADMIN — ONDANSETRON 4 MG: 2 INJECTION INTRAMUSCULAR; INTRAVENOUS at 22:44

## 2018-07-23 RX ADMIN — MORPHINE SULFATE 2 MG: 2 INJECTION, SOLUTION INTRAMUSCULAR; INTRAVENOUS at 09:09

## 2018-07-23 RX ADMIN — HYDRALAZINE HYDROCHLORIDE 25 MG: 25 TABLET, FILM COATED ORAL at 06:26

## 2018-07-23 RX ADMIN — GABAPENTIN 100 MG: 100 CAPSULE ORAL at 21:21

## 2018-07-23 RX ADMIN — SODIUM CHLORIDE: 900 INJECTION, SOLUTION INTRAVENOUS at 17:33

## 2018-07-23 RX ADMIN — GABAPENTIN 100 MG: 100 CAPSULE ORAL at 09:37

## 2018-07-23 RX ADMIN — INSULIN LISPRO 2 UNITS: 100 INJECTION, SOLUTION INTRAVENOUS; SUBCUTANEOUS at 09:09

## 2018-07-23 RX ADMIN — HYDROMORPHONE HYDROCHLORIDE 1 MG: 1 INJECTION, SOLUTION INTRAMUSCULAR; INTRAVENOUS; SUBCUTANEOUS at 20:22

## 2018-07-23 RX ADMIN — ASPIRIN 81 MG: 81 TABLET, COATED ORAL at 09:10

## 2018-07-23 RX ADMIN — ATORVASTATIN CALCIUM 40 MG: 40 TABLET, FILM COATED ORAL at 21:21

## 2018-07-23 RX ADMIN — ONDANSETRON 4 MG: 2 INJECTION INTRAMUSCULAR; INTRAVENOUS at 06:17

## 2018-07-23 RX ADMIN — ALBUTEROL SULFATE 2.5 MG: 2.5 SOLUTION RESPIRATORY (INHALATION) at 15:35

## 2018-07-23 RX ADMIN — HYDRALAZINE HYDROCHLORIDE 25 MG: 25 TABLET, FILM COATED ORAL at 21:21

## 2018-07-23 RX ADMIN — OXYCODONE HYDROCHLORIDE AND ACETAMINOPHEN 1 TABLET: 5; 325 TABLET ORAL at 11:40

## 2018-07-23 RX ADMIN — ONDANSETRON 4 MG: 2 INJECTION INTRAMUSCULAR; INTRAVENOUS at 14:54

## 2018-07-23 RX ADMIN — CARVEDILOL 25 MG: 25 TABLET, FILM COATED ORAL at 21:21

## 2018-07-23 RX ADMIN — INSULIN LISPRO 2 UNITS: 100 INJECTION, SOLUTION INTRAVENOUS; SUBCUTANEOUS at 21:20

## 2018-07-23 RX ADMIN — Medication 10 ML: at 21:20

## 2018-07-23 RX ADMIN — OXYCODONE HYDROCHLORIDE AND ACETAMINOPHEN 1 TABLET: 5; 325 TABLET ORAL at 06:31

## 2018-07-23 RX ADMIN — ENOXAPARIN SODIUM 40 MG: 40 INJECTION SUBCUTANEOUS at 09:09

## 2018-07-23 RX ADMIN — OXYCODONE HYDROCHLORIDE AND ACETAMINOPHEN 1 TABLET: 5; 325 TABLET ORAL at 02:15

## 2018-07-23 RX ADMIN — ALBUTEROL SULFATE 2.5 MG: 2.5 SOLUTION RESPIRATORY (INHALATION) at 21:34

## 2018-07-23 RX ADMIN — HYDRALAZINE HYDROCHLORIDE 25 MG: 25 TABLET, FILM COATED ORAL at 14:54

## 2018-07-23 RX ADMIN — CARVEDILOL 25 MG: 25 TABLET, FILM COATED ORAL at 09:10

## 2018-07-23 ASSESSMENT — PAIN DESCRIPTION - PAIN TYPE
TYPE: ACUTE PAIN

## 2018-07-23 ASSESSMENT — PAIN DESCRIPTION - LOCATION
LOCATION: GROIN;ABDOMEN
LOCATION: GROIN
LOCATION: GROIN;ABDOMEN

## 2018-07-23 ASSESSMENT — PAIN DESCRIPTION - PROGRESSION
CLINICAL_PROGRESSION: GRADUALLY WORSENING
CLINICAL_PROGRESSION: GRADUALLY WORSENING
CLINICAL_PROGRESSION: NOT CHANGED
CLINICAL_PROGRESSION: NOT CHANGED

## 2018-07-23 ASSESSMENT — PAIN DESCRIPTION - ORIENTATION
ORIENTATION: LEFT

## 2018-07-23 ASSESSMENT — PAIN DESCRIPTION - DESCRIPTORS
DESCRIPTORS: ACHING
DESCRIPTORS: DISCOMFORT

## 2018-07-23 ASSESSMENT — PAIN SCALES - GENERAL
PAINLEVEL_OUTOF10: 10
PAINLEVEL_OUTOF10: 6
PAINLEVEL_OUTOF10: 7
PAINLEVEL_OUTOF10: 8
PAINLEVEL_OUTOF10: 10
PAINLEVEL_OUTOF10: 8
PAINLEVEL_OUTOF10: 9
PAINLEVEL_OUTOF10: 9
PAINLEVEL_OUTOF10: 0
PAINLEVEL_OUTOF10: 7

## 2018-07-23 ASSESSMENT — PAIN DESCRIPTION - ONSET
ONSET: AWAKENED FROM SLEEP
ONSET: ON-GOING

## 2018-07-23 ASSESSMENT — PAIN DESCRIPTION - FREQUENCY
FREQUENCY: CONTINUOUS

## 2018-07-23 NOTE — CONSULTS
Department of Internal Medicine  Nephrology Attending Consult Note      Reason for Consult:  Acute kidney injury  Requesting Physician:  Dr. Archie Duran:  Chest pain and dyspnea. History Obtained From:  patient    HISTORY OF PRESENT ILLNESS:  Mrs. Swapna Holloway is a 49-year-old female with a past medical history of coronary artery disease status post CABG in 2015. She also has history of osteoarthritis, spinal stenosis and is currently wheelchair bound. She took NSAID for many years. She is status post PCI for coronary disease. She has history of diabetes mellitus and was told in the past that she may have underlying stage III chronic kidney disease. She presented on July 18, 2018 for shortness of breath and chest pain. It was pressure-like sensation in the anterior of the chest which was nonradiating. It was associated with dyspnea, nausea and diaphoresis. She denies cough, chills, fever. As mentioned earlier she was told by her primary care physician should she has underlying chronic kidney disease. She also has indwelling Ba catheter and was supposed to see a urologist.  Ba cath was placed after prolonged hospital stay. She underwent cardiac catheterization on July 20, 2018. It revealed SVG/ PDA and LIMA graft. EF was 50%. He was placed on medical management including aspirin, Lipitor and Coreg. She developed left lower quadrant pain due to hematoma around angiogram insertion site. Creatinine has increased from 1.1-1.4 and a drop of GFR 38 mL per minute. Hence, they were called for further evaluation and management of acute kidney injury.         Past Medical History:        Diagnosis Date    Arthritis     Asthma     Cancer ([facility] [address]     Chronic airway obstruction (HCC)     Chronic back pain     Coronary artery disease 10/2014    2 stents to LAD    CRF (chronic renal failure)     baseline Cr 1.1    Diabetes mellitus (HCC)     A1c 6.6 11/30/2014    Hidradenitis     99%   BMI 41.61 kg/m²     General appearance:    Awake and alert, obese female  Head Eye Ear Nose : Atraumatic, normocephalic,  oral mucosa moist  Neck: Supple, JVD -  Respiratory: Visible chest scar/deformity no,  Respiratory effort OK       Wheezes none   rubs or crackles at bases   Oxygen none  Cardiovascular: Murmur none  rub or gallop No  Edema none  Abdomen: Soft   tenderness no   distended no  scars no  Suprapubic tenderness no     Dressing/visible mass none    Musculoskeletal:  Ulcer no   Visible deformity no, bilateral lower extremity atrophy  Neurologic:  Gross focal motor deficits non-focal    Cranial nerves grossly non-focal   Skin: Rashes No   turgor OK    DATA:    CBC:   Lab Results   Component Value Date    WBC 9.2 07/23/2018    RBC 2.59 07/23/2018    RBC 2.98 04/14/2017    HGB 7.6 07/23/2018    HCT 24.2 07/23/2018    MCV 93.3 07/23/2018    MCH 29.3 07/23/2018    MCHC 31.4 07/23/2018    RDW 17.5 07/23/2018     07/23/2018    MPV 9.4 07/23/2018     BMP:    Lab Results   Component Value Date     07/23/2018    K 5.0 07/23/2018    K 4.6 07/19/2018    CL 99 07/23/2018    CO2 33 07/23/2018    BUN 34 07/23/2018    LABALBU 3.5 07/23/2018    CREATININE 1.4 07/23/2018    CALCIUM 9.1 07/23/2018    GFRAA 46 07/23/2018    LABGLOM 38 07/23/2018    GLUCOSE 183 07/23/2018    GLUCOSE 235 03/30/2017     Hepatic Function Panel:    Lab Results   Component Value Date    ALKPHOS 97 07/19/2018    ALT 9 07/19/2018    AST 16 07/19/2018    PROT 6.3 07/19/2018    PROT 6.7 03/30/2017    BILITOT <0.2 07/19/2018    BILIDIR <0.2 07/18/2018    IBILI see below 07/18/2018    LABALBU 3.5 07/23/2018     Magnesium:    Lab Results   Component Value Date    MG 1.60 03/03/2017     Phosphorus:    Lab Results   Component Value Date    PHOS 3.9 07/23/2018     Uric Acid:    Lab Results   Component Value Date    LABURIC 6.8 03/03/2017     Radiology Review:  Nuclear myocardial scan showed large area of reversible ischemia.   LVEF was 41% .    Chest x-ray on admission was clear    CT of the chest showed small left-sided pleural effusion and lymphadenopathy. IMPRESSION/RECOMMENDATIONS:      Acute kidney injury on stage III chronic kidney disease: She has underlying stage III chronic kidney disease from diabetic nephropathy and probably vascular disease. She has history of proteinuria which needs to be quantified. Acute kidney injury is multifactorial but likely a result of contrast on top of stage III chronic kidney disease, diabetes and anemia. I will avoid any further nephrotoxins including NSAID or IV contrast.  I will hold off introduction of ACE inhibitor at this point but will try at a later date. I will check urinalysis, urine for protein creatinine and ultrasound of the kidney. I will check urine for eosinophils. She was told in the past per her PCP that she has stage III chronic kidney disease. She also acknowledges chronic intake of NSAID for many years for osteoarthritis. She has frothy/bubbly urine and has diabetes. There is no evidence for tumor lysis syndrome. She she was not hypotensive. She is getting Macrodantin for UTI. Anemia: She has a small hematoma in the groin. I will check iron studies, B12 and folate. She does not need Aranesp at this point. She has history of hyperlipidemia and will continue with moderate intensity Lipitor. She has history of congestive heart failure and is on hydralazine, carvedilol. I will hold off on ACE inhibitor or ARB till the kidney function recovers.     Thanks    Nephrology  [address]  Office: [phone]  Cell: [phone]  Fax: [phone]

## 2018-07-23 NOTE — PROGRESS NOTES
[address] [facility] Daily Progress Note      Admit Date:  7/18/2018    Reason of Consultation:  Chest pain   CC: chest pain and shortness of breath     Subjective:  Ms. Lucie Leos was seen and examined with Dr. Michelle Disla at bedside. Patient endorsed having LLQ tenderness around site of angiogram insertion site. Patient denies chest pain or shortness of breath. Objective:   BP (!) 155/75   Pulse 68   Temp 98.1 °F (36.7 °C) (Oral)   Resp 18   Ht 4' 11\" (1.499 m)   Wt 206 lb (93.4 kg)   SpO2 97%   BMI 41.61 kg/m²     Intake/Output Summary (Last 24 hours) at 07/23/18 1141  Last data filed at 07/23/18 0554   Gross per 24 hour   Intake              240 ml   Output             2800 ml   Net            -2560 ml       TELEMETRY: normal sinus rhythm     Physical Exam:  General:  Awake, alert  Eyes:  Normal conjunctiva   Skin:  Warm and dry. Neck:  JVP normal  Chest:  Clear to auscultation bilaterally   Cardiovascular: RRR, Normal S1S2. Abdomen:  Soft NT, tenderness to palpation on LLQ angiogram insertion site which is non-erythematous or sanguinous   Extremities:  No edema   Neuro:   No focal deficits. Psych:  Normal thought and affect. MSK:  No Cyanosis nor Clubbing.       Medications:    carvedilol  25 mg Oral BID    hydrALAZINE  25 mg Oral 3 times per day    aspirin  81 mg Oral Daily    atorvastatin  40 mg Oral Nightly    gabapentin  100 mg Oral TID    insulin detemir  50 Units Subcutaneous BID    levothyroxine  175 mcg Oral QAM AC    sodium chloride flush  10 mL Intravenous 2 times per day    insulin lispro  0-12 Units Subcutaneous TID WC    insulin lispro  0-6 Units Subcutaneous Nightly    enoxaparin  40 mg Subcutaneous Daily    albuterol  2.5 mg Nebulization TID      dextrose       acetaminophen, morphine **OR** morphine, oxyCODONE-acetaminophen, albuterol, sodium chloride flush, acetaminophen, magnesium hydroxide, ondansetron, glucose, dextrose, glucagon (rDNA), dextrose,

## 2018-07-23 NOTE — CONSULTS
VascularSurgery   Resident Consult Note      Reason for consult: 10 cm L groin pseudoaneurysm    History obtained from:    History of Present Illness :   Ami Maradiaga is a 57 y.o. female who was admitted for chest pain s/p [facility] on 7/20/18 accessed through left femoral artery. Patient complains of left groin pain started post operatively and it has been worsening since then. She also noticed a mass in her left groin that has been expanding within the past few days. Patient states she has chronic neuropathy on both legs. No fever or chill. Ba in place. Denies chest pain, SOB. Patient takes aspirin and DVT ppx Lovenox. Risk Factors: Age, Gender, Family History, Smoking, HTN, Hyperlipidemia, Diabetes    Past Medical History:        Diagnosis Date    Arthritis     Asthma     Cancer ([facility]     Chronic airway obstruction (HCC)     Chronic back pain     Coronary artery disease 10/2014    2 stents to LAD    CRF (chronic renal failure)     baseline Cr 1.1    Diabetes mellitus (HCC)     A1c 6.6 11/30/2014    Hidradenitis     History of blood transfusion     Hyperlipidemia     Hypertension     Hypothyroidism     Interstitial lung disease ([facility]     NSTEMI (non-ST elevated myocardial infarction) ([facility] 10/2014    Sciatica     Seborrhea capitis        Past Surgical History:           Procedure Laterality Date    CARPAL TUNNEL RELEASE      right arm    CORONARY ANGIOPLASTY WITH STENT PLACEMENT      CORONARY ARTERY BYPASS GRAFT  04/2015    OVARY REMOVAL      TONSILLECTOMY         Allergies:  Amoxicillin-pot clavulanate; Actos [pioglitazone]; Amoxicillin; Avandia [rosiglitazone]; Itzel Allegra; Buspirone; Cephalexin; Chlorhexidine; Ciprofloxacin; Clindamycin/lincomycin; Escitalopram oxalate [escitalopram oxalate]; Insulin glargine; Iodides; Iodinated diagnostic agents [iodinated diagnostic agents]; Januvia [sitagliptin]; Metformin and related [metformin and related];  Mirtazapine; Montelukast; Oxycontin [oxycodone hcl]; Pcn [penicillins]; Potassium clavulanate [clavulanic acid]; Pregabalin; Sertraline; Spironolactone; Sulfanilamide; Zithromax [azithromycin]; Zoloft; and Montelukast sodium    Medications:   Home Meds  No current facility-administered medications on file prior to encounter. Current Outpatient Prescriptions on File Prior to Encounter   Medication Sig Dispense Refill    carvedilol (COREG) 12.5 MG tablet Take 1 tablet by mouth 2 times daily 60 tablet 0    albuterol (PROVENTIL) (2.5 MG/3ML) 0.083% nebulizer solution Take 3 mLs by nebulization every 6 hours as needed (cough) 120 each 0    aspirin 81 MG EC tablet Take 81 mg by mouth daily      nitroGLYCERIN (NITRODUR) 0.4 MG/HR Place 1 patch onto the skin daily       atorvastatin (LIPITOR) 40 MG tablet Take 40 mg by mouth nightly       insulin lispro (HUMALOG) 100 UNIT/ML injection vial Inject 2-10 Units into the skin 3 times daily (before meals) Per home sliding scale      levothyroxine (SYNTHROID) 175 MCG tablet Take 175 mcg by mouth every morning (before breakfast)       HYDROcodone-acetaminophen (NORCO) 5-325 MG per tablet Take 1 tablet by mouth every 6 hours as needed for Pain .  Earliest Fill Date: 6/1/17 60 tablet 0    ondansetron (ZOFRAN) 8 MG tablet TAKE ONE TABLET BY MOUTH EVERY 8 HOURS AS NEEDED FOR NAUSEA OR VOMITING 45 tablet 0    senna (SENOKOT) 8.6 MG TABS tablet Take 1 tablet by mouth 2 times daily (Patient taking differently: Take 1 tablet by mouth 2 times daily as needed for Constipation ) 120 tablet 0    omeprazole (PRILOSEC) 20 MG capsule Take 20 mg by mouth daily      loperamide (IMODIUM) 2 MG capsule Take 2 mg by mouth 4 times daily as needed for Diarrhea       Current Meds    VENELEX ointment BID   0.9 % sodium chloride infusion Continuous   carvedilol (COREG) tablet 25 mg BID   acetaminophen (TYLENOL) tablet 650 mg Q4H PRN   morphine injection 1 mg Q4H PRN   Or    morphine injection 2 mg Q4H BLOODU TRACE-LYSED 07/18/2018    GLUCOSEU Negative 07/18/2018    AMORPHOUS 3+ 02/10/2016       Imaging  LV arterial duplex  Technically difficult study due to body habitus and depth of the vessels. There is a pseudoaneurysm present in the left groin area, arising from the   common femoral artery. There is a large amount of hematoma present, as well as   two open chambers where blood flow is noted. Including the thrombosed area,   the pseudoaneurysm measures approximately 10.0 x 3.96 cm in size and the two   open chambers measure 3.43 x 1.66 and 3.06 x 1.68 cm. The neck of the pseudoaneurysm is 1.22 cm long and .34 cm wide. The common femoral artery is very poorly visualized, due to depth, but appears   to have an abnormal, monophasic Doppler signal, indicating inflow disease. The common femoral vein could be visualized and has normal, phasic Doppler   signals (indicating no evidence of AF fistula). There were no previous studies to use for comparison. Assessment/Plan: This is a 57 y.o. female with pseudoaneurysm of the L common femoral artery [facility] (7/20). Patient is currently stable without expanding hematoma.    -Transfer to ICU for close monitoring  -Strict bedrest, avoid movement/flexion of the left hip  -NPO  -Hold Lovenox tonight  -Thrombin injection tomorrow AM      Discussed with Dr. Aparna Pelletier MD  PGY-1 General Surgery  07/23/18  6:27 PM  [phone]

## 2018-07-24 ENCOUNTER — APPOINTMENT (OUTPATIENT)
Dept: VASCULAR LAB | Age: 63
DRG: 191 | End: 2018-07-24
Payer: MEDICAID

## 2018-07-24 ENCOUNTER — APPOINTMENT (OUTPATIENT)
Dept: ULTRASOUND IMAGING | Age: 63
DRG: 191 | End: 2018-07-24
Payer: MEDICAID

## 2018-07-24 LAB
ALBUMIN SERPL-MCNC: 3.2 G/DL (ref 3.4–5)
ANION GAP SERPL CALCULATED.3IONS-SCNC: 8 MMOL/L (ref 3–16)
BUN BLDV-MCNC: 34 MG/DL (ref 7–20)
CALCIUM SERPL-MCNC: 9 MG/DL (ref 8.3–10.6)
CHLORIDE BLD-SCNC: 103 MMOL/L (ref 99–110)
CO2: 30 MMOL/L (ref 21–32)
CREAT SERPL-MCNC: 1.2 MG/DL (ref 0.6–1.2)
GFR AFRICAN AMERICAN: 55
GFR NON-AFRICAN AMERICAN: 45
GLUCOSE BLD-MCNC: 146 MG/DL (ref 70–99)
GLUCOSE BLD-MCNC: 188 MG/DL (ref 70–99)
GLUCOSE BLD-MCNC: 194 MG/DL (ref 70–99)
GLUCOSE BLD-MCNC: 220 MG/DL (ref 70–99)
GLUCOSE BLD-MCNC: 234 MG/DL (ref 70–99)
HCT VFR BLD CALC: 23 % (ref 36–48)
HEMOGLOBIN: 7.3 G/DL (ref 12–16)
MCH RBC QN AUTO: 29.5 PG (ref 26–34)
MCHC RBC AUTO-ENTMCNC: 32 G/DL (ref 31–36)
MCV RBC AUTO: 92.2 FL (ref 80–100)
PDW BLD-RTO: 16.9 % (ref 12.4–15.4)
PERFORMED ON: ABNORMAL
PHOSPHORUS: 4.6 MG/DL (ref 2.5–4.9)
PLATELET # BLD: 190 K/UL (ref 135–450)
PMV BLD AUTO: 8.8 FL (ref 5–10.5)
POTASSIUM SERPL-SCNC: 4.8 MMOL/L (ref 3.5–5.1)
RBC # BLD: 2.49 M/UL (ref 4–5.2)
SODIUM BLD-SCNC: 141 MMOL/L (ref 136–145)
TROPONIN: <0.01 NG/ML
WBC # BLD: 9.4 K/UL (ref 4–11)

## 2018-07-24 PROCEDURE — 36002 PSEUDOANEURYSM INJECTION TRT: CPT

## 2018-07-24 PROCEDURE — 6360000002 HC RX W HCPCS: Performed by: SURGERY

## 2018-07-24 PROCEDURE — 76942 ECHO GUIDE FOR BIOPSY: CPT

## 2018-07-24 PROCEDURE — 6360000002 HC RX W HCPCS: Performed by: INTERNAL MEDICINE

## 2018-07-24 PROCEDURE — 99232 SBSQ HOSP IP/OBS MODERATE 35: CPT | Performed by: INTERNAL MEDICINE

## 2018-07-24 PROCEDURE — 36415 COLL VENOUS BLD VENIPUNCTURE: CPT

## 2018-07-24 PROCEDURE — 6370000000 HC RX 637 (ALT 250 FOR IP): Performed by: INTERNAL MEDICINE

## 2018-07-24 PROCEDURE — 84484 ASSAY OF TROPONIN QUANT: CPT

## 2018-07-24 PROCEDURE — 2700000000 HC OXYGEN THERAPY PER DAY

## 2018-07-24 PROCEDURE — 76770 US EXAM ABDO BACK WALL COMP: CPT

## 2018-07-24 PROCEDURE — 6370000000 HC RX 637 (ALT 250 FOR IP)

## 2018-07-24 PROCEDURE — 2580000003 HC RX 258: Performed by: INTERNAL MEDICINE

## 2018-07-24 PROCEDURE — 80069 RENAL FUNCTION PANEL: CPT

## 2018-07-24 PROCEDURE — 94761 N-INVAS EAR/PLS OXIMETRY MLT: CPT

## 2018-07-24 PROCEDURE — 93005 ELECTROCARDIOGRAM TRACING: CPT | Performed by: INTERNAL MEDICINE

## 2018-07-24 PROCEDURE — 94640 AIRWAY INHALATION TREATMENT: CPT

## 2018-07-24 PROCEDURE — 85027 COMPLETE CBC AUTOMATED: CPT

## 2018-07-24 PROCEDURE — 99254 IP/OBS CNSLTJ NEW/EST MOD 60: CPT | Performed by: SURGERY

## 2018-07-24 PROCEDURE — 3E053GC INTRODUCTION OF OTHER THERAPEUTIC SUBSTANCE INTO PERIPHERAL ARTERY, PERCUTANEOUS APPROACH: ICD-10-PCS | Performed by: SURGERY

## 2018-07-24 PROCEDURE — 36002 PSEUDOANEURYSM INJECTION TRT: CPT | Performed by: SURGERY

## 2018-07-24 PROCEDURE — 2000000000 HC ICU R&B

## 2018-07-24 RX ORDER — FERROUS SULFATE 325(65) MG
325 TABLET ORAL
Status: DISCONTINUED | OUTPATIENT
Start: 2018-07-25 | End: 2018-07-25 | Stop reason: HOSPADM

## 2018-07-24 RX ORDER — NITROGLYCERIN 0.4 MG/1
TABLET SUBLINGUAL
Status: COMPLETED
Start: 2018-07-24 | End: 2018-07-24

## 2018-07-24 RX ORDER — HYDROCODONE BITARTRATE AND ACETAMINOPHEN 5; 325 MG/1; MG/1
1 TABLET ORAL EVERY 6 HOURS PRN
Qty: 5 TABLET | Refills: 0 | Status: SHIPPED | OUTPATIENT
Start: 2018-07-24 | End: 2018-07-29

## 2018-07-24 RX ORDER — GABAPENTIN 100 MG/1
100 CAPSULE ORAL 3 TIMES DAILY
Qty: 90 CAPSULE | Refills: 3
Start: 2018-07-24 | End: 2018-07-28

## 2018-07-24 RX ORDER — HYDRALAZINE HYDROCHLORIDE 25 MG/1
25 TABLET, FILM COATED ORAL EVERY 8 HOURS SCHEDULED
Qty: 90 TABLET | Refills: 3 | Status: SHIPPED | OUTPATIENT
Start: 2018-07-24

## 2018-07-24 RX ADMIN — INSULIN LISPRO 5 UNITS: 100 INJECTION, SOLUTION INTRAVENOUS; SUBCUTANEOUS at 12:23

## 2018-07-24 RX ADMIN — HYDRALAZINE HYDROCHLORIDE 25 MG: 25 TABLET, FILM COATED ORAL at 13:47

## 2018-07-24 RX ADMIN — ONDANSETRON 4 MG: 2 INJECTION INTRAMUSCULAR; INTRAVENOUS at 06:14

## 2018-07-24 RX ADMIN — INSULIN LISPRO 2 UNITS: 100 INJECTION, SOLUTION INTRAVENOUS; SUBCUTANEOUS at 21:09

## 2018-07-24 RX ADMIN — Medication 10 ML: at 16:02

## 2018-07-24 RX ADMIN — HYDROMORPHONE HYDROCHLORIDE 1 MG: 1 INJECTION, SOLUTION INTRAMUSCULAR; INTRAVENOUS; SUBCUTANEOUS at 10:22

## 2018-07-24 RX ADMIN — INSULIN LISPRO 5 UNITS: 100 INJECTION, SOLUTION INTRAVENOUS; SUBCUTANEOUS at 17:37

## 2018-07-24 RX ADMIN — GABAPENTIN 100 MG: 100 CAPSULE ORAL at 13:47

## 2018-07-24 RX ADMIN — INSULIN LISPRO 4 UNITS: 100 INJECTION, SOLUTION INTRAVENOUS; SUBCUTANEOUS at 12:22

## 2018-07-24 RX ADMIN — ATORVASTATIN CALCIUM 40 MG: 40 TABLET, FILM COATED ORAL at 21:02

## 2018-07-24 RX ADMIN — OXYCODONE HYDROCHLORIDE AND ACETAMINOPHEN 1 TABLET: 5; 325 TABLET ORAL at 08:07

## 2018-07-24 RX ADMIN — ONDANSETRON 4 MG: 2 INJECTION INTRAMUSCULAR; INTRAVENOUS at 16:02

## 2018-07-24 RX ADMIN — Medication 10 ML: at 21:12

## 2018-07-24 RX ADMIN — HYDROMORPHONE HYDROCHLORIDE 0.5 MG: 1 INJECTION, SOLUTION INTRAMUSCULAR; INTRAVENOUS; SUBCUTANEOUS at 15:02

## 2018-07-24 RX ADMIN — GABAPENTIN 100 MG: 100 CAPSULE ORAL at 21:03

## 2018-07-24 RX ADMIN — INSULIN LISPRO 2 UNITS: 100 INJECTION, SOLUTION INTRAVENOUS; SUBCUTANEOUS at 17:34

## 2018-07-24 RX ADMIN — HYDROMORPHONE HYDROCHLORIDE 1 MG: 1 INJECTION, SOLUTION INTRAMUSCULAR; INTRAVENOUS; SUBCUTANEOUS at 05:33

## 2018-07-24 RX ADMIN — NITROGLYCERIN 0.4 MG: 0.4 TABLET SUBLINGUAL at 20:53

## 2018-07-24 RX ADMIN — CASTOR OIL AND BALSAM, PERU: 788; 87 OINTMENT TOPICAL at 21:04

## 2018-07-24 RX ADMIN — CARVEDILOL 25 MG: 25 TABLET, FILM COATED ORAL at 10:22

## 2018-07-24 RX ADMIN — HYDROMORPHONE HYDROCHLORIDE 0.5 MG: 1 INJECTION, SOLUTION INTRAMUSCULAR; INTRAVENOUS; SUBCUTANEOUS at 21:13

## 2018-07-24 RX ADMIN — ALBUTEROL SULFATE 2.5 MG: 2.5 SOLUTION RESPIRATORY (INHALATION) at 13:25

## 2018-07-24 RX ADMIN — ASPIRIN 81 MG: 81 TABLET, COATED ORAL at 10:22

## 2018-07-24 RX ADMIN — NITROGLYCERIN 0.4 MG: 0.4 TABLET SUBLINGUAL at 20:47

## 2018-07-24 RX ADMIN — CARVEDILOL 25 MG: 25 TABLET, FILM COATED ORAL at 21:03

## 2018-07-24 RX ADMIN — OXYCODONE HYDROCHLORIDE AND ACETAMINOPHEN 1 TABLET: 5; 325 TABLET ORAL at 19:06

## 2018-07-24 RX ADMIN — IRON SUCROSE 200 MG: 20 INJECTION, SOLUTION INTRAVENOUS at 13:50

## 2018-07-24 RX ADMIN — CASTOR OIL AND BALSAM, PERU: 788; 87 OINTMENT TOPICAL at 12:24

## 2018-07-24 RX ADMIN — OXYCODONE HYDROCHLORIDE AND ACETAMINOPHEN 1 TABLET: 5; 325 TABLET ORAL at 13:47

## 2018-07-24 RX ADMIN — ALBUTEROL SULFATE 2.5 MG: 2.5 SOLUTION RESPIRATORY (INHALATION) at 06:57

## 2018-07-24 RX ADMIN — GABAPENTIN 100 MG: 100 CAPSULE ORAL at 10:22

## 2018-07-24 RX ADMIN — HYDROMORPHONE HYDROCHLORIDE 1 MG: 1 INJECTION, SOLUTION INTRAMUSCULAR; INTRAVENOUS; SUBCUTANEOUS at 00:35

## 2018-07-24 RX ADMIN — Medication 10 ML: at 10:23

## 2018-07-24 ASSESSMENT — ACTIVITIES OF DAILY LIVING (ADL): EFFECT OF PAIN ON DAILY ACTIVITIES: DISCOMFORT

## 2018-07-24 ASSESSMENT — PAIN DESCRIPTION - DESCRIPTORS
DESCRIPTORS: SORE
DESCRIPTORS: ACHING
DESCRIPTORS: SORE
DESCRIPTORS: ACHING
DESCRIPTORS: SORE
DESCRIPTORS: TIGHTNESS
DESCRIPTORS: TIGHTNESS
DESCRIPTORS: SORE

## 2018-07-24 ASSESSMENT — PAIN DESCRIPTION - PROGRESSION
CLINICAL_PROGRESSION: NOT CHANGED
CLINICAL_PROGRESSION: NOT CHANGED
CLINICAL_PROGRESSION: GRADUALLY IMPROVING
CLINICAL_PROGRESSION: NOT CHANGED
CLINICAL_PROGRESSION: OTHER (COMMENT)
CLINICAL_PROGRESSION: GRADUALLY WORSENING
CLINICAL_PROGRESSION: NOT CHANGED
CLINICAL_PROGRESSION: GRADUALLY IMPROVING
CLINICAL_PROGRESSION: NOT CHANGED
CLINICAL_PROGRESSION: GRADUALLY WORSENING

## 2018-07-24 ASSESSMENT — PAIN SCALES - GENERAL
PAINLEVEL_OUTOF10: 6
PAINLEVEL_OUTOF10: 9
PAINLEVEL_OUTOF10: 9
PAINLEVEL_OUTOF10: 7
PAINLEVEL_OUTOF10: 9
PAINLEVEL_OUTOF10: 7
PAINLEVEL_OUTOF10: 6
PAINLEVEL_OUTOF10: 9
PAINLEVEL_OUTOF10: 7
PAINLEVEL_OUTOF10: 7
PAINLEVEL_OUTOF10: 9
PAINLEVEL_OUTOF10: 7

## 2018-07-24 ASSESSMENT — PAIN DESCRIPTION - PAIN TYPE
TYPE: ACUTE PAIN

## 2018-07-24 ASSESSMENT — PAIN DESCRIPTION - ORIENTATION
ORIENTATION: LEFT

## 2018-07-24 ASSESSMENT — PAIN DESCRIPTION - FREQUENCY
FREQUENCY: CONTINUOUS

## 2018-07-24 ASSESSMENT — PAIN DESCRIPTION - ONSET
ONSET: ON-GOING
ONSET: OTHER (COMMENT)
ONSET: ON-GOING

## 2018-07-24 ASSESSMENT — PAIN DESCRIPTION - LOCATION
LOCATION: GROIN
LOCATION: GROIN
LOCATION: LEG
LOCATION: GROIN

## 2018-07-25 VITALS
RESPIRATION RATE: 17 BRPM | BODY MASS INDEX: 41.53 KG/M2 | WEIGHT: 206 LBS | SYSTOLIC BLOOD PRESSURE: 123 MMHG | HEART RATE: 70 BPM | HEIGHT: 59 IN | OXYGEN SATURATION: 100 % | TEMPERATURE: 98.2 F | DIASTOLIC BLOOD PRESSURE: 102 MMHG

## 2018-07-25 LAB
ALBUMIN SERPL-MCNC: 3.5 G/DL (ref 3.4–5)
ANION GAP SERPL CALCULATED.3IONS-SCNC: 11 MMOL/L (ref 3–16)
BASOPHILS ABSOLUTE: 0 K/UL (ref 0–0.2)
BASOPHILS RELATIVE PERCENT: 0.5 %
BUN BLDV-MCNC: 29 MG/DL (ref 7–20)
CALCIUM SERPL-MCNC: 9.2 MG/DL (ref 8.3–10.6)
CHLORIDE BLD-SCNC: 103 MMOL/L (ref 99–110)
CO2: 30 MMOL/L (ref 21–32)
CREAT SERPL-MCNC: 1.3 MG/DL (ref 0.6–1.2)
EOSINOPHILS ABSOLUTE: 0.3 K/UL (ref 0–0.6)
EOSINOPHILS RELATIVE PERCENT: 3.3 %
GFR AFRICAN AMERICAN: 50
GFR NON-AFRICAN AMERICAN: 41
GLUCOSE BLD-MCNC: 170 MG/DL (ref 70–99)
GLUCOSE BLD-MCNC: 189 MG/DL (ref 70–99)
GLUCOSE BLD-MCNC: 250 MG/DL (ref 70–99)
HCT VFR BLD CALC: 23.5 % (ref 36–48)
HEMOGLOBIN: 7.4 G/DL (ref 12–16)
INR BLD: 1.04 (ref 0.86–1.14)
LYMPHOCYTES ABSOLUTE: 1.4 K/UL (ref 1–5.1)
LYMPHOCYTES RELATIVE PERCENT: 16.1 %
MCH RBC QN AUTO: 29 PG (ref 26–34)
MCHC RBC AUTO-ENTMCNC: 31.4 G/DL (ref 31–36)
MCV RBC AUTO: 92.4 FL (ref 80–100)
MONOCYTES ABSOLUTE: 0.8 K/UL (ref 0–1.3)
MONOCYTES RELATIVE PERCENT: 8.4 %
NEUTROPHILS ABSOLUTE: 6.4 K/UL (ref 1.7–7.7)
NEUTROPHILS RELATIVE PERCENT: 71.7 %
PDW BLD-RTO: 17.2 % (ref 12.4–15.4)
PERFORMED ON: ABNORMAL
PERFORMED ON: ABNORMAL
PHOSPHORUS: 4.4 MG/DL (ref 2.5–4.9)
PLATELET # BLD: 181 K/UL (ref 135–450)
PMV BLD AUTO: 9 FL (ref 5–10.5)
POTASSIUM SERPL-SCNC: 4.9 MMOL/L (ref 3.5–5.1)
PROTHROMBIN TIME: 11.9 SEC (ref 9.8–13)
RBC # BLD: 2.55 M/UL (ref 4–5.2)
SODIUM BLD-SCNC: 144 MMOL/L (ref 136–145)
TROPONIN: <0.01 NG/ML
WBC # BLD: 9 K/UL (ref 4–11)

## 2018-07-25 PROCEDURE — 80069 RENAL FUNCTION PANEL: CPT

## 2018-07-25 PROCEDURE — 6370000000 HC RX 637 (ALT 250 FOR IP): Performed by: INTERNAL MEDICINE

## 2018-07-25 PROCEDURE — 2580000003 HC RX 258: Performed by: INTERNAL MEDICINE

## 2018-07-25 PROCEDURE — 6360000002 HC RX W HCPCS: Performed by: SURGERY

## 2018-07-25 PROCEDURE — 93926 LOWER EXTREMITY STUDY: CPT

## 2018-07-25 PROCEDURE — 94761 N-INVAS EAR/PLS OXIMETRY MLT: CPT

## 2018-07-25 PROCEDURE — 85610 PROTHROMBIN TIME: CPT

## 2018-07-25 PROCEDURE — 36415 COLL VENOUS BLD VENIPUNCTURE: CPT

## 2018-07-25 PROCEDURE — 85025 COMPLETE CBC W/AUTO DIFF WBC: CPT

## 2018-07-25 PROCEDURE — 2700000000 HC OXYGEN THERAPY PER DAY

## 2018-07-25 PROCEDURE — 84484 ASSAY OF TROPONIN QUANT: CPT

## 2018-07-25 RX ORDER — FERROUS SULFATE 325(65) MG
325 TABLET ORAL
Qty: 30 TABLET | Refills: 3 | Status: SHIPPED | OUTPATIENT
Start: 2018-07-26

## 2018-07-25 RX ORDER — SODIUM CHLORIDE 9 MG/ML
INJECTION, SOLUTION INTRAVENOUS
Status: DISPENSED
Start: 2018-07-25 | End: 2018-07-25

## 2018-07-25 RX ADMIN — INSULIN LISPRO 5 UNITS: 100 INJECTION, SOLUTION INTRAVENOUS; SUBCUTANEOUS at 08:02

## 2018-07-25 RX ADMIN — Medication 10 ML: at 09:00

## 2018-07-25 RX ADMIN — INSULIN LISPRO 6 UNITS: 100 INJECTION, SOLUTION INTRAVENOUS; SUBCUTANEOUS at 13:19

## 2018-07-25 RX ADMIN — ASPIRIN 81 MG: 81 TABLET, COATED ORAL at 08:01

## 2018-07-25 RX ADMIN — OXYCODONE HYDROCHLORIDE AND ACETAMINOPHEN 1 TABLET: 5; 325 TABLET ORAL at 07:57

## 2018-07-25 RX ADMIN — CASTOR OIL AND BALSAM, PERU: 788; 87 OINTMENT TOPICAL at 08:01

## 2018-07-25 RX ADMIN — HYDROMORPHONE HYDROCHLORIDE 1 MG: 1 INJECTION, SOLUTION INTRAMUSCULAR; INTRAVENOUS; SUBCUTANEOUS at 10:05

## 2018-07-25 RX ADMIN — INSULIN LISPRO 5 UNITS: 100 INJECTION, SOLUTION INTRAVENOUS; SUBCUTANEOUS at 13:21

## 2018-07-25 RX ADMIN — INSULIN LISPRO 2 UNITS: 100 INJECTION, SOLUTION INTRAVENOUS; SUBCUTANEOUS at 08:01

## 2018-07-25 RX ADMIN — LEVOTHYROXINE SODIUM 175 MCG: 150 TABLET ORAL at 07:57

## 2018-07-25 RX ADMIN — OXYCODONE HYDROCHLORIDE AND ACETAMINOPHEN 1 TABLET: 5; 325 TABLET ORAL at 12:06

## 2018-07-25 RX ADMIN — OXYCODONE HYDROCHLORIDE AND ACETAMINOPHEN 1 TABLET: 5; 325 TABLET ORAL at 00:32

## 2018-07-25 RX ADMIN — FERROUS SULFATE TAB 325 MG (65 MG ELEMENTAL FE) 325 MG: 325 (65 FE) TAB at 07:57

## 2018-07-25 RX ADMIN — ENOXAPARIN SODIUM 40 MG: 100 INJECTION SUBCUTANEOUS at 08:01

## 2018-07-25 RX ADMIN — CARVEDILOL 25 MG: 25 TABLET, FILM COATED ORAL at 08:01

## 2018-07-25 RX ADMIN — GABAPENTIN 100 MG: 100 CAPSULE ORAL at 08:01

## 2018-07-25 ASSESSMENT — PAIN DESCRIPTION - PAIN TYPE
TYPE: ACUTE PAIN

## 2018-07-25 ASSESSMENT — PAIN DESCRIPTION - PROGRESSION
CLINICAL_PROGRESSION: NOT CHANGED

## 2018-07-25 ASSESSMENT — PAIN DESCRIPTION - ONSET
ONSET: ON-GOING

## 2018-07-25 ASSESSMENT — ACTIVITIES OF DAILY LIVING (ADL)
EFFECT OF PAIN ON DAILY ACTIVITIES: DISCOMFORT
EFFECT OF PAIN ON DAILY ACTIVITIES: DISCOMFORT

## 2018-07-25 ASSESSMENT — PAIN DESCRIPTION - LOCATION
LOCATION: GROIN
LOCATION: GROIN
LOCATION: BACK

## 2018-07-25 ASSESSMENT — PAIN DESCRIPTION - FREQUENCY
FREQUENCY: CONTINUOUS

## 2018-07-25 ASSESSMENT — PAIN SCALES - GENERAL
PAINLEVEL_OUTOF10: 6
PAINLEVEL_OUTOF10: 5
PAINLEVEL_OUTOF10: 7
PAINLEVEL_OUTOF10: 7
PAINLEVEL_OUTOF10: 0
PAINLEVEL_OUTOF10: 4
PAINLEVEL_OUTOF10: 8

## 2018-07-25 ASSESSMENT — PAIN DESCRIPTION - DIRECTION: RADIATING_TOWARDS: LEG

## 2018-07-25 ASSESSMENT — PAIN DESCRIPTION - ORIENTATION
ORIENTATION: LEFT

## 2018-07-25 ASSESSMENT — PAIN DESCRIPTION - DESCRIPTORS
DESCRIPTORS: SORE
DESCRIPTORS: SORE
DESCRIPTORS: STABBING;SHOOTING

## 2018-07-25 NOTE — PROGRESS NOTES
[facility]    [facility]            [phone]                         Interval Plan:        Creatinine is improving 1.4  > 1.3 . Iron is low and will replace orally  stopped IVF                     Assessment :     Acute kidney injury on stage III chronic kidney disease: She has underlying stage III chronic kidney disease from diabetic nephropathy and probably vascular disease. She has history of proteinuria which needs to be quantified. Acute kidney injury is multifactorial but likely a result of contrast on top of stage III chronic kidney disease, diabetes and anemia.     I will avoid any further nephrotoxins including NSAID or IV contrast.  I will hold off introduction of ACE inhibitor at this point but will try at a later date.     I will check urinalysis, urine for protein creatinine. Ultrasound of the kidney revealed normal sized kidneys with no hydronephrosis    She was told in the past per her PCP that she has stage III chronic kidney disease. She also acknowledges chronic intake of NSAID for many years for osteoarthritis. She has frothy/bubbly urine and has diabetes. There is no evidence for tumor lysis syndrome. She she was not hypotensive.       Anemia: She has a small hematoma in the groin. She does not need Aranesp at this point. She has iron deficiency and is on replacement     She has history of hyperlipidemia and will continue with moderate intensity Lipitor.     She has history of congestive heart failure and is on hydralazine, carvedilol. I will hold off on ACE inhibitor or ARB till the kidney function recovers. Please call with questions at-  24 hrs Answering service [phone]   7 am-5 pm at Perfect serve, or cell phone  Dr Braulio Chakraborty        CC/ Reason for consult:     HENRIQUE    HPI:     Mrs. Jaime Mckinney is a 25-year-old female with a past medical history of coronary artery disease status post CABG in 2015.   She also has history of alert, obese female  Head Eye Ear Nose : Atraumatic, normocephalic,  oral mucosa moist  Neck: Supple, JVD -  Respiratory: Visible chest scar/deformity no,  Respiratory effort OK       Wheezes none   rubs or crackles at bases   Oxygen none  Cardiovascular: Murmur none  rub or gallop No  Edema none  Abdomen: Soft   tenderness no   distended no  scars no  Suprapubic tenderness no     Dressing/visible mass none    Musculoskeletal:  Ulcer no   Visible deformity no, bilateral lower extremity atrophy  Neurologic:  Gross focal motor deficits non-focal    Cranial nerves grossly non-focal   Skin: Rashes No   turgor OK      Meds:      enoxaparin  40 mg Subcutaneous Daily    thrombin   Topical Once    thrombin   Topical Once    insulin lispro  5 Units Subcutaneous TID WC    iron sucrose  200 mg Intravenous Q24H    ferrous sulfate  325 mg Oral Daily with breakfast    VENELEX   Topical BID    carvedilol  25 mg Oral BID    hydrALAZINE  25 mg Oral 3 times per day    aspirin  81 mg Oral Daily    atorvastatin  40 mg Oral Nightly    gabapentin  100 mg Oral TID    insulin detemir  50 Units Subcutaneous BID    levothyroxine  175 mcg Oral QAM AC    sodium chloride flush  10 mL Intravenous 2 times per day    insulin lispro  0-12 Units Subcutaneous TID WC    insulin lispro  0-6 Units Subcutaneous Nightly    albuterol  2.5 mg Nebulization TID       Labs:     Recent Labs      07/23/18   0847  07/24/18   2111  07/25/18   0325   WBC  9.2  9.4  9.0   HGB  7.6*  7.3*  7.4*   HCT  24.2*  23.0*  23.5*   PLT  186  190  181          Recent Labs      07/23/18   0847  07/24/18   0444  07/25/18   0325   NA  139  141  144   K  5.0  4.8  4.9   CL  99  103  103   CO2  33*  30  30   BUN  34*  34*  29*   CREATININE  1.4*  1.2  1.3*   GLUCOSE  183*  146*  170*   PHOS  3.9  4.6  4.4      Nephrology  [address], [address]  Office: [phone]  Cell: [phone]  Fax: [phone]

## 2018-07-25 NOTE — CONSULTS
Family History:    Family History   Problem Relation Age of Onset   [employer] Stroke Mother     Stroke Father     Heart Disease Father     Other Sister     Heart Disease Brother     Kidney Disease Brother         Allergies: Allergies   Allergen Reactions    Amoxicillin-Pot Clavulanate Itching    Actos [Pioglitazone]     Amoxicillin     Avandia [Rosiglitazone]      H/a blurred vision    Benicar [Olmesartan]     Buspirone Diarrhea    Cephalexin Diarrhea    Chlorhexidine Itching    Ciprofloxacin     Clindamycin/Lincomycin Diarrhea    Escitalopram Oxalate [Escitalopram Oxalate] Other (See Comments)     Depression night schmidt    Insulin Glargine Hives    Iodides Hives and Itching     Severe reaction in 1983.   Slight reaction in July 2014    Iodinated Diagnostic Agents [Iodinated Diagnostic Agents]     Januvia [Sitagliptin]     Metformin And Related [Metformin And Related]      H/a blurred vision    Mirtazapine Nausea Only    Montelukast     Oxycontin [Oxycodone Hcl]     Pcn [Penicillins]     Potassium Clavulanate [Clavulanic Acid]     Pregabalin Hives    Sertraline Other (See Comments)     suicidal    Spironolactone     Sulfanilamide     Zithromax [Azithromycin]     Zoloft      sucidial    Montelukast Sodium Anxiety        Medications:    Current Facility-Administered Medications   Medication Dose Route Frequency Provider Last Rate Last Dose    enoxaparin (LOVENOX) injection 40 mg  40 mg Subcutaneous Daily Rod Ayala MD   40 mg at 07/25/18 0801    thrombin spray   Topical Once Farhana Manzo MD        thrombin spray   Topical Once Mart Cristina MD        insulin lispro (HUMALOG) injection pen 5 Units  5 Units Subcutaneous TID WC Afshan Watt MD   5 Units at 07/25/18 1321    iron sucrose (VENOFER) 200 mg in sodium chloride 0.9 % 100 mL IVPB  200 mg Intravenous Q24H Afshan Watt MD   Stopped at 07/24/18 1450    ferrous sulfate tablet 325 mg  325 mg Oral Daily with breakfast injection 4 mg  4 mg Intravenous Q6H PRN Cody Perez MD   4 mg at 07/24/18 1602    glucose (GLUTOSE) 40 % oral gel 15 g  15 g Oral PRN Cody Perez MD        dextrose 50 % solution 12.5 g  12.5 g Intravenous PRN Cody Perez MD        glucagon (rDNA) injection 1 mg  1 mg Intramuscular PRN Cody Perez MD        dextrose 5 % solution  100 mL/hr Intravenous PRN Cody Perez MD        insulin lispro (HUMALOG) injection pen 0-12 Units  0-12 Units Subcutaneous TID WC Cody Perez MD   6 Units at 07/25/18 1319    insulin lispro (HUMALOG) injection pen 0-6 Units  0-6 Units Subcutaneous Nightly Cody Perez MD   2 Units at 07/24/18 2109    albuterol (PROVENTIL) nebulizer solution 2.5 mg  2.5 mg Nebulization TID Teresa Gastelum MD   2.5 mg at 07/24/18 1325    diazepam (VALIUM) tablet 2.5 mg  2.5 mg Oral Q12H PRN Min Jeter MD   2.5 mg at 07/22/18 2148          Review of Systems:    · History obtained from patient, otherwise, further 10 point ROS is negative        Physical Exam:    BP (!) 123/102   Pulse 70   Temp 98.2 °F (36.8 °C) (Oral)   Resp 17   Ht 4' 11\" (1.499 m)   Wt 206 lb (93.4 kg)   SpO2 100%   BMI 41.61 kg/m²     General appearance: alert and cooperative; no acute distress  Head: NCAT, PERRLA, EOMI; oral and nasopharynx without lesions, dentition adequate repair  Neck: no JVD or thyromegaly  Lungs: clear to auscultation bilaterally; no audible rhonchi, rales, wheezes or crackles  Heart: regular rate and rhythm, S1, S2 normal; no murmurs, rubs or gallops  Abdomen: soft, non-tender and non-distended; normoactive, tympanic bowel sounds; no hepatosplenomegaly  Extremities: no cyanosis, clubbing, edema or asymmetry  Skin: no jaundice, purpura or petechiae  Lymph Nodes:  no auricular, cervical, supraclavicular, axillary, inguinal or popliteal lymphadenopathy  Neurologic:  CN 2-12 intact; no focal motor, sensory or cerebellar deficits        Laboratory cholelithiasis. Soft tissue density noted in the retroperitoneum as seen previously. There is no destructive bone lesion seen. 1. Small left pleural effusion. 2. Mild mediastinal lymphadenopathy which could be reactive or neoplastic. 3. Cholelithiasis. 4. Soft tissue density in the retroperitoneum as seen previously which could relate to previously treated lymphoma. Active lymphoma be difficult to exclude. Interpreted by: Elsa Stallings MD Signed by: Elsa Stallings MD 7/19/18 Final result    Us Renal Complete    Result Date: 7/24/2018  Acute renal failure Renal ultrasound No significant obstruction seen right kidney. Appearance of the kidney similar to a prior ultrasound dated 2/11/2016. Right kidney measures approximately 13.6 cm. Small 1.9 cm cyst right kidney. No perinephric fluid. Left kidney shows no gross obstruction. No renal mass or perinephric fluid. However, renal borders are poorly delineated. Small 2.1 cm cyst seen left kidney. This was noted on a prior ultrasound as well. Left kidney measures approximately 12.5 cm. Bladder not well distended. Ba catheter not well delineated     1. No significant obstruction either kidney.  2. Small renal cysts    Vl Dup Lower Extremity Arteries Left    Result Date: 7/25/2018  Vascular Lower Extremities Arterial Duplex Procedure -- PRELIMINARY SONOGRAPHER REPORT --   Demographics   Patient Name       Mike Cox   Date of Study      07/25/2018        Gender              Female   Patient Number     [id number]        Date of Birth       1955   Visit Number       [id number]         Age                 57 year(s)   Accession Number   [id number]         Room Number         5269   Corporate ID       [id number]          Sonographer         Blas Wallace RVT   Ordering Physician Darryn Torrez MD Interpreting        [address] [place] [address] Vascular                                       Physician           Readers Procedure Type of Study:   Extremities Arteries:Lower Extremities Arterial Duplex, VL LOWER EXTREMITY  ARTERIES DUPLEX LEFT. Tech Comments Left Technically difficult study due to body habitus. This is a bedside ICU scan. The left groin pseudoaneurysm appears to be completely thrombosed. Large amounts of hematoma are visualized in the groin area. Normal, spontaneous and phasic Doppler signals are noted in the common femoral vein, indicating no evidence for AV fistula. The common femoral artery, proximal and mid superficial femoral artery are patent and have biphasic Doppler signals, possibly indicating inflow disease. The deep femoral artery could not be visualized (possibly due to body habitus). Calcific wall shadowing is noted in the SFA. Vl Dup Lower Extremity Arteries Left    Result Date: 7/24/2018  Pseudoaneurysm Survey  Demographics   Patient Name       Daniel Monaco   Date of Study      07/24/2018        Gender              Female   Patient Number     [id number]        Date of Birth       1955   Visit Number       [id number]         Age                 57 year(s)   Accession Number   [id number]         Room Number         5484   Corporate ID       [id number]          Sonographer         Damari Wallace RVT   Ordering Physician John Eisenberg MD Interpreting        [facility]                                       Physician           Readers                                                           Deshawn Ramires MD  Procedure Type of Study:   Pseudoaneurysm:VASC PSEUDOANEURYSM INJECTION PERCUTANEOUS. Vascular Sonographer Report  Indications for Study:Pseudoaneurysm. Additional Indications:Patient has a large left groin pseudoaneurysm post cardiac cath 4 days ago. This is a bedside ICU thrombin injection of that pseudoaneurysm.  Limited Arterial Duplex Scan: left groin

## 2018-07-25 NOTE — PROGRESS NOTES
[facility] Daily Progress Note      Admit Date:  7/18/2018    Reason of Consultation:  Chest pain   CC: chest pain and shortness of breath     Subjective:  Ms. Derrell Musa was seen and examined with Dr. Tamela Nielsen at bedside prior to her discharge. She has no complaints. Objective:   BP (!) 123/102   Pulse 70   Temp 98.2 °F (36.8 °C) (Oral)   Resp 17   Ht 4' 11\" (1.499 m)   Wt 206 lb (93.4 kg)   SpO2 100%   BMI 41.61 kg/m²       Intake/Output Summary (Last 24 hours) at 07/25/18 1525  Last data filed at 07/25/18 0802   Gross per 24 hour   Intake                0 ml   Output             1725 ml   Net            -1725 ml       TELEMETRY: normal sinus rhythm     Physical Exam:  General:  Awake, alert  Eyes:  Normal conjunctiva   Skin:  Warm and dry. Chest:  Clear to auscultation bilaterally   Cardiovascular: RRR, Normal S1S2. Abdomen:  Soft NT  Extremities: 2+ pitting edema in left lower extremity, +1 pitting edema in right lower extremity    Neuro:   No focal deficits. Psych:  Normal thought and affect.           Medications:    enoxaparin  40 mg Subcutaneous Daily    thrombin   Topical Once    thrombin   Topical Once    insulin lispro  5 Units Subcutaneous TID WC    iron sucrose  200 mg Intravenous Q24H    ferrous sulfate  325 mg Oral Daily with breakfast    VENELEX   Topical BID    carvedilol  25 mg Oral BID    hydrALAZINE  25 mg Oral 3 times per day    aspirin  81 mg Oral Daily    atorvastatin  40 mg Oral Nightly    gabapentin  100 mg Oral TID    insulin detemir  50 Units Subcutaneous BID    levothyroxine  175 mcg Oral QAM AC    sodium chloride flush  10 mL Intravenous 2 times per day    insulin lispro  0-12 Units Subcutaneous TID WC    insulin lispro  0-6 Units Subcutaneous Nightly    albuterol  2.5 mg Nebulization TID      dextrose       HYDROmorphone **OR** HYDROmorphone, acetaminophen, oxyCODONE-acetaminophen, albuterol, sodium chloride flush, acetaminophen, magnesium hydroxide, ondansetron, glucose, dextrose, glucagon (rDNA), dextrose, diazepam    Lab Data:  CBC:   Recent Labs      07/23/18   0847  07/24/18   2111  07/25/18   0325   WBC  9.2  9.4  9.0   HGB  7.6*  7.3*  7.4*   HCT  24.2*  23.0*  23.5*   MCV  93.3  92.2  92.4   PLT  186  190  181     BMP:   Recent Labs      07/23/18   0847  07/24/18   0444  07/25/18   0325   NA  139  141  144   K  5.0  4.8  4.9   CL  99  103  103   CO2  33*  30  30   PHOS  3.9  4.6  4.4   BUN  34*  34*  29*   CREATININE  1.4*  1.2  1.3*       Assessment/Plan:    CAD:   - continue medical management   - continue aspirin 81mg qd  - continue atorvastatin 40mg qhs   - continue carvedilol 25mg BID     Pseudoaneurysm s/p Angiogram - resolved   - s/p thrombin injection     Discussed with Dr. Luz Marina Florian MD 7/25/2018 3:25 PM  Internal Medicine, PGY-2  Pager: [phone]

## 2018-07-25 NOTE — PROGRESS NOTES
Surgery Daily Progress Note      CC: 10 cm L groin pseudoaneurysm    Subjective :  Afebrile, hemodynamically stable. No acute event overnight. Pain is controlled. ROS: A 10 point review of systems was conducted, significant findings as noted above. All other systems negative. Objective     Exam:  Vitals:    07/25/18 0425 07/25/18 0430 07/25/18 0435 07/25/18 0500   BP:    (!) 127/37   Pulse: 67 70 67 63   Resp: 20 16 17 12   Temp:       TempSrc:       SpO2: 96% 100% 100%    Weight:       Height:           General appearance: alert, no acute distress, grooming appropriate  Eyes: PERRLA, no scleral icterus  Neck: trachea midline, no JVD, no lymphadenopathy  Chest/Lungs: CTAB, no crackles/rales, wheezes/rhonchi, normal effort  Cardiovascular: RRR, no murmurs/gallops/rubs  Abdomen: soft, non-tender, non-distended, +BS, no guarding/rigidity  Skin: warm and dry, no rashes  Extremities: L groin with palpable hard mass measuring about 8 cm, non-pulsating, no thrill or bruit, minimal ecchymosis around the catheter site. No drainage or bleeding. Palpable DP. Distal LLE well perfused, no edema, intact sensation. Neuro: A&Ox3, no focal deficits, sensation intact       ASSESSMENT/PLAN:   This is a 57 y.o. female  with pseudoaneurysm of the L common femoral artery [facility] (7/20). Patient is currently stable without expanding hematoma.  S/p thrombin injection on 7/24  - follow up VL arterial duplex this AM  - Management per primary team        Lit Beal MD  PGY-1 General Surgery  07/25/18  6:46 AM  [phone]

## 2018-07-25 NOTE — DISCHARGE SUMMARY
large hematoma and 10 x 3.9 cm pseudoaneurysm present after which vascular surgery was consulted. She underwent thrombin injection on 7/24/18 after which a repeat doppler showed persistent left groin hematoma with pseudoaneurysm completely thrombosed. Her pain has been much controlled and she had good palpable distal pulses on exam. She also had anemia for which she was treated with IV iron and discharged with po iron supplements with follow up CBC in 4 days. She also had UTI treated with Nitrofurantoin in setting of chronic brito. Also had HENRIQUE which was improved/reswolved and nephrology was consulted for that. Not started on ACEi/ARB per nephrology recc's. Since she had history of DLBCL s/p CHOP tx and CT Scan had showed small effusion with lymph nodes, oncology was consulted and she will follow up with them as outpatient as well. She is being discharged in a stable condition with follow up's. She was instructed to call 911 and return with chest pain, sob, fever, chills, worsening left groin swelling/pain or dizziness. Discharge Medications:   Current Discharge Medication List      START taking these medications    Details   ferrous sulfate 325 (65 Fe) MG tablet Take 1 tablet by mouth daily (with breakfast)  Qty: 30 tablet, Refills: 3      hydrALAZINE (APRESOLINE) 25 MG tablet Take 1 tablet by mouth every 8 hours  Qty: 90 tablet, Refills: 3    Associated Diagnoses: Hx of CABG           Current Discharge Medication List      CONTINUE these medications which have CHANGED    Details   !! gabapentin (NEURONTIN) 100 MG capsule Take 1 capsule by mouth 3 times daily for 4 days. Viola Cecilio: 90 capsule, Refills: 3    Associated Diagnoses: Hx of CABG      HYDROcodone-acetaminophen (NORCO) 5-325 MG per tablet Take 1 tablet by mouth every 6 hours as needed for Pain for up to 5 days. Viola Cecilio: 5 tablet, Refills: 0    Associated Diagnoses: Hx of CABG       ! ! - Potential duplicate medications found. Please discuss with provider. CO2 30 07/25/2018    BUN 29 07/25/2018    CREATININE 1.3 07/25/2018    CALCIUM 9.2 07/25/2018    PHOS 4.4 07/25/2018    .0 03/28/2018    ALKPHOS 97 07/19/2018    ALT 9 07/19/2018    AST 16 07/19/2018    BILITOT <0.2 07/19/2018    BILIDIR <0.2 07/18/2018    LABALBU 3.5 07/25/2018    LDLCALC 18 07/19/2018    TRIG 207 07/19/2018     Lab Results   Component Value Date    INR 1.04 07/25/2018    INR 1.00 03/28/2018    INR 0.96 03/03/2017       Radiology:  Xr Chest Standard (2 Vw)    Result Date: 7/18/2018  XR CHEST (2 VW) Indication: chest pain,  Comparison: March 28, 2018 Findings: AP upright and lateral views of the chest were obtained. The evaluation is somewhat limited due to patient habitus. Median sternotomy wires are again noted along with a left atrial appendage occlusion device. The cardiac silhouette is unchanged in size. The visualized lung fields are clear of focal consolidation. There is no large effusion or pneumothorax. The osseous structures are unchanged. Impression: No acute process. Interpreted by: Lina Cary MD Signed by: Lina Cary MD 7/18/18 Final result    Ct Chest Wo Contrast    Result Date: 7/19/2018  CT chest without contrast HISTORY: Chest pain. COMPARISON: CT chest dated May 25, 2017. Up-to-date CT equipment was utilized and radiation dose reduction techniques were utilized. FINDINGS: The central airways are patent. There is a small left pleural effusion which is new since the prior chest CT. No lobar consolidation identified. There are sequelae of old healed granulomatous disease. There is mild mediastinal lymphadenopathy. For example the precarinal region there is a node that measures 15 mm. There are low-density lesions arising from the left kidney as seen previously. There is cholelithiasis. Soft tissue density noted in the retroperitoneum as seen previously. There is no destructive bone lesion seen. 1. Small left pleural effusion.  2. Mild mediastinal habitus. This is a bedside ICU scan. The left groin pseudoaneurysm appears to be completely thrombosed. Large amounts of hematoma are visualized in the groin area. Normal, spontaneous and phasic Doppler signals are noted in the common femoral vein, indicating no evidence for AV fistula. The common femoral artery, proximal and mid superficial femoral artery are patent and have biphasic Doppler signals, possibly indicating inflow disease. The deep femoral artery could not be visualized (possibly due to body habitus). Calcific wall shadowing is noted in the SFA. Vl Dup Lower Extremity Arteries Left    Result Date: 7/24/2018  Pseudoaneurysm Survey  Demographics   Patient Name       Mike Cox   Date of Study      07/24/2018        Gender              Female   Patient Number     [id number]        Date of Birth       1955   Visit Number       [id number]         Age                 57 year(s)   Accession Number   [id number]         Room Number         7409   Corporate ID       [id number]          Sonographer         Blas Wallace RVT   Ordering Physician Darryn Torrez MD Interpreting        [facility]                                       Physician           Readers                                                           Richie Lo MD  Procedure Type of Study:   Pseudoaneurysm:VASC PSEUDOANEURYSM INJECTION PERCUTANEOUS. Vascular Sonographer Report  Indications for Study:Pseudoaneurysm. Additional Indications:Patient has a large left groin pseudoaneurysm post cardiac cath 4 days ago. This is a bedside ICU thrombin injection of that pseudoaneurysm. Limited Arterial Duplex Scan: left groin scan, color flow and B-mode imaging as well as Doppler waveform analysis. Impressions Left Impression Technically difficult study due to body habitus.  This is a bedside ICU thrombin injection of a left groin pseudoaneurysm performed by Dr. Nu Ness. The pseudoaneurysm was successfully thrombosed by thrombin injection. The common femoral vein post procedure was noted to be patent with phasic Doppler waveforms (no evidence of AV fistula). The common femoral artery was very difficult to see (due to body habitus and a large hematoma) however the waveform post procedure was multiphasic with a velocity of 136 cm/s. The SFA was also multiphasic with a velocity of 84.6 cm/s. Conclusions   Summary   Thrombosed pseudoaneurysm S/P Thrombin injection. CFA and CFV patent post injection. Signature   ------------------------------------------------------------------  Electronically signed by José Antonio Thompson MD (Interpreting  physician) on 07/24/2018 at 09:10 PM  ------------------------------------------------------------------  Patient Status:Routine. Study [address] [place] [address] - Vascular Lab. Technical Quality:Adequate visualization. Risk Factors History +---------+----+----------+ ! Diagnosis! Date! Comments  ! +---------+----+----------+ ! Other    ! !Neuropathy! +---------+----+----------+   - The patient's risk factor(s) include: diabetes mellitus, dyslipidemia,     obesity and arterial hypertension.   - The patient has a former tobacco history. Velocities are measured in cm/s ; Diameters are measured in cm Pseudoaneurysm Measurements   - A pseudoaneurysm was found on 07/23/2018 on the Left Mid Common Femoral.   - Pseudoaneurysm cause is post cath and the treatment was thrombin     injection. Venous flow character is:patent.  Injections    Vl Dup Lower Extremity Arteries Left    Result Date: 7/23/2018  Lower Extremities Arterial Duplex  Demographics   Patient Name       Jennifer Live   Date of Study      07/23/2018         Gender              Female   Patient Number     [id number]         Date of Birth       1955   Visit Number       [id number]          Age                 57 year(s)   Accession Number   [id number]          Room Number         4741   Corporate ID       [id number]           Sonographer         Sav Wallace RVT   Ordering Physician [facility]                     MD                 Physician           Readers                                                            Adan Iniguez MD  Procedure Type of Study:   Extremities Arteries:Lower Extremities Arterial Duplex, VL LOWER EXTREMITY  ARTERIES DUPLEX LEFT. Vascular Sonographer Report  Indications for Study:Pseudoaneurysm and Leg pain. Additional Indications:Left groin pain, three days post cardiac cath (via the left common femoral artery). Limited Arterial Duplex Scan: Left groin area, including color and B-mode imaging as well as Doppler waveform analysis. Impressions Left Impression Technically difficult study due to body habitus and depth of the vessels. There is a pseudoaneurysm present in the left groin area, arising from the common femoral artery. There is a large amount of hematoma present, as well as two open chambers where blood flow is noted. Including the thrombosed area, the pseudoaneurysm measures approximately 10.0 x 3.96 cm in size and the two open chambers measure 3.43 x 1.66 and 3.06 x 1.68 cm. The neck of the pseudoaneurysm is 1.22 cm long and .34 cm wide. The common femoral artery is very poorly visualized, due to depth, but appears to have an abnormal, monophasic Doppler signal, indicating inflow disease. The common femoral vein could be visualized and has normal, phasic Doppler signals (indicating no evidence of AF fistula). There were no previous studies to use for comparison. Conclusions   Summary   Left common femoral artery pseudoaneurysm. Possible proximal CFA or Iliac artery disease.    Signature

## 2018-07-27 LAB
EKG ATRIAL RATE: 70 BPM
EKG DIAGNOSIS: NORMAL
EKG P AXIS: 78 DEGREES
EKG P-R INTERVAL: 170 MS
EKG Q-T INTERVAL: 422 MS
EKG QRS DURATION: 130 MS
EKG QTC CALCULATION (BAZETT): 455 MS
EKG R AXIS: -58 DEGREES
EKG T AXIS: 75 DEGREES
EKG VENTRICULAR RATE: 70 BPM

## 2018-07-27 PROCEDURE — 93010 ELECTROCARDIOGRAM REPORT: CPT | Performed by: INTERNAL MEDICINE
